# Patient Record
Sex: FEMALE | Race: WHITE | NOT HISPANIC OR LATINO | Employment: FULL TIME | URBAN - METROPOLITAN AREA
[De-identification: names, ages, dates, MRNs, and addresses within clinical notes are randomized per-mention and may not be internally consistent; named-entity substitution may affect disease eponyms.]

---

## 2017-03-15 PROBLEM — C56.9 OVARIAN CANCER: Status: ACTIVE | Noted: 2017-03-15

## 2017-03-15 PROBLEM — T78.40XA ALLERGY: Status: ACTIVE | Noted: 2017-03-15

## 2017-03-29 ENCOUNTER — OFFICE VISIT (OUTPATIENT)
Dept: GYNECOLOGIC ONCOLOGY | Facility: CLINIC | Age: 52
End: 2017-03-29

## 2017-03-29 VITALS
OXYGEN SATURATION: 96 % | SYSTOLIC BLOOD PRESSURE: 119 MMHG | HEART RATE: 75 BPM | RESPIRATION RATE: 16 BRPM | DIASTOLIC BLOOD PRESSURE: 78 MMHG | HEIGHT: 63 IN | WEIGHT: 168 LBS | TEMPERATURE: 97.5 F | BODY MASS INDEX: 29.77 KG/M2

## 2017-03-29 DIAGNOSIS — Z85.43 HISTORY OF OVARIAN CANCER: ICD-10-CM

## 2017-03-29 DIAGNOSIS — Z01.419 WELL WOMAN EXAM WITH ROUTINE GYNECOLOGICAL EXAM: Primary | ICD-10-CM

## 2017-03-29 PROCEDURE — 99396 PREV VISIT EST AGE 40-64: CPT | Performed by: NURSE PRACTITIONER

## 2017-03-29 RX ORDER — LEVETIRACETAM 500 MG/1
750 TABLET ORAL 2 TIMES DAILY
COMMUNITY
Start: 2017-03-27 | End: 2018-03-06 | Stop reason: SDUPTHER

## 2017-03-29 RX ORDER — FLUTICASONE PROPIONATE 50 MCG
2 SPRAY, SUSPENSION (ML) NASAL DAILY
COMMUNITY

## 2017-03-29 RX ORDER — FEXOFENADINE HCL 180 MG/1
180 TABLET ORAL DAILY
COMMUNITY

## 2017-03-29 NOTE — PROGRESS NOTES
GYN ONCOLOGY ANNUAL WELL WOMAN VISIT      Sada Juárez  [id number]  1965      Chief Complaint: Gynecologic Exam (with no complaints)        History of present illness:  Sada Juárez is a 51 y.o. year old female who is here today for an annual exam.  The patient has a remote history of ovarian cancer, diagnosed at the age of 28, status post surgery and completion of chemotherapy with Dr. Corbett at .  She's had no evidence of recurrence to date.  She is feeling overall well and has been able to maintain her weight loss, intentional of 42 pounds over the past year.  She continues to try and be active as well as maintain dietary modifications that helped her succeed in losing weight.  She has been healthy since her last visit with no significant changes in health history.  Her bowels and bladder are functioning well.  She's had no abdominal bloating, early satiety, pelvic pain, vaginal bleeding, or vaginal discharge.  Her mammogram is up-to-date, done this month to [facility].  She had a colonoscopy done for screening through Dr. Mejia at [facility].  Recommendations were made for 5 year follow-up exam.  She has not yet had a bone density for baseline.  She does Renetta exercise with walking several times throughout the week and does take a multivitamin with calcium and vitamin D supplement.  She fractured her left thumb earlier this year and since that time has started glucosamine/chondroitin supplement that does seem to help with her arthritis-type pain.  She does not want to have CA-125 drawn for screening.  She notes that last time she received a pill from her insurance company as her ovarian cancer history is so remote that they did not want to cover her screening tumor markers.    Cancer History:    No history exists.       Obstetric History:  OB History      Para Term  AB TAB SAB Ectopic Multiple Living    3 3                Obstetric Comments    2 children given up for  adoption         Menstrual History:     No LMP recorded. Patient has had a hysterectomy.          Past Medical History:   Diagnosis Date   • History of ovarian cancer    • Seasonal allergies    • Seizure disorder        Past Surgical History:   Procedure Laterality Date   • APPENDECTOMY     • TONSILLECTOMY     • TOTAL ABDOMINAL HYSTERECTOMY WITH SALPINGO OOPHORECTOMY         MEDICATIONS: The current medication list was reviewed and reconciled.     Allergies:  is allergic to sulfa antibiotics.    History reviewed. No pertinent family history.    Health Maintenance:  Last mammogram was 3/2017. Last colonoscopy was 12/2016, with recommended follow-up in 5 year(s). Last pap smear was 2016, results were  normal PAP..    Tumor marker:   Lab Results   Component Value Date     7.4 03/28/2016       Review of Systems   Constitutional: Negative for fatigue, fever and unexpected weight change.   HENT: Negative for congestion, ear pain, hearing loss, sinus pressure and trouble swallowing.    Eyes: Negative for visual disturbance.   Respiratory: Negative for cough, chest tightness, shortness of breath and wheezing.    Cardiovascular: Negative for chest pain, palpitations and leg swelling.   Gastrointestinal: Negative for abdominal distention, abdominal pain, constipation, diarrhea, nausea and vomiting.   Endocrine: Negative for cold intolerance, heat intolerance, polydipsia, polyphagia and polyuria.   Genitourinary: Negative for difficulty urinating, dyspareunia, dysuria, frequency, hematuria, pelvic pain, urgency, vaginal bleeding, vaginal discharge and vaginal pain.   Musculoskeletal: Positive for arthralgias. Negative for gait problem, joint swelling and myalgias.   Skin: Negative for color change, pallor and rash.   Neurological: Negative for dizziness, seizures, syncope, weakness, light-headedness, numbness and headaches.   Hematological: Negative for adenopathy. Does not bruise/bleed easily.   Psychiatric/Behavioral:  "Negative for agitation, confusion, sleep disturbance and suicidal ideas. The patient is not nervous/anxious.        Physical Exam  Vital Signs: /78  Pulse 75  Temp 97.5 °F (36.4 °C) (Temporal Artery )   Resp 16  Ht 63\" (160 cm)  Wt 168 lb (76.2 kg)  SpO2 96%  BMI 29.76 kg/m2   General Appearance:  alert, cooperative, no apparent distress and appears stated age   Neurologic/Psychiatric: A&O x 3, gait steady, appropriate affect   HEENT:  Normocephalic, without obvious abnormality, mucous membranes moist   Neck: Supple, symmetrical, trachea midline, no adenopathy;  No thyromegaly, masses, or tenderness   Back:   Symmetric, no curvature, ROM normal, no CVA tenderness   Lungs:   Clear to auscultation bilaterally; respirations regular, even, and unlabored bilaterally   Heart:  Regular rate and rhythm, no murmurs appreciated   Breasts:  Symmetrical, no masses, no lesions and no nipple discharge   Abdomen:   Soft, non-tender, non-distended and no organomegaly   Lymph nodes: No cervical, supraclavicular, inguinal or axillary adenopathy noted   Extremities: Normal, atraumatic; no clubbing, cyanosis, or edema    Skin: No rashes, ulcers, or suspicious lesions noted   Pelvic: External Genitalia  without lesions or skin changes  Vagina  is pale, atrophic.   Vaginal Cuff  Female Vaginal Cuff: smooth, intact, without visible lesions and pap obtained  Uterus  surgically absent  Ovaries  surgically absent bilaterallly and without palpable masses or fullness  Parametria  smooth  Rectovaginal  Female rectovaginal: confirms no masses or bleeding and Hemoccult negative     ECOG Performance Status: 1 - Symptomatic but completely ambulatory     Procedure Note:  No notes on file    Assessment and Plan:    Sada was seen today for gynecologic exam.    Diagnoses and all orders for this visit:    Well woman exam with routine gynecological exam  -     Liquid-based Pap Smear, Screening; Future    History of ovarian cancer      The " "patient was instructed to call the office in 1 week for results of her pap smear.   The patient was instructed to call with vaginal bleeding, discharge, pelvic pain, change in bowel or bladder function, or any new symptoms for evaluation of her complaints.   Mamm UTD  Colonoscopy UTD- next due 12/2021  Will consider doing baseline bone density at age 54 due to history of early menopause with no current HRT. The patient does take calcium and vitamin D daily, as well as glucosamine supplement, and tries to stay active.   The patient and I discussed the  program for Advanced Care Planning, \"Conversations that Matter\".  She was offered this service, free of charge, for development of advance directives with a certified ACP facilitator. She is understanding that this will allow her to assign a healthcare surrogate and make her wishes known regarding her own future medical care. She is very interested in learning more about this option and was given information at her visit today.    Return in about 1 year (around 3/29/2018) for Annual Exam.      Elizabeth Myles, GINNY      Note: Speech recognition transcription software was used to dictate portions of this document.  An attempt at proofreading has been made though minor errors in transcription may still be present.  Please do not hesitate to call our office with any questions.  "

## 2017-11-16 ENCOUNTER — APPOINTMENT (OUTPATIENT)
Dept: CT IMAGING | Facility: HOSPITAL | Age: 52
End: 2017-11-16

## 2017-11-16 ENCOUNTER — HOSPITAL ENCOUNTER (EMERGENCY)
Facility: HOSPITAL | Age: 52
Discharge: HOME | End: 2017-11-16
Attending: EMERGENCY MEDICINE | Admitting: EMERGENCY MEDICINE

## 2017-11-16 VITALS
HEART RATE: 69 BPM | TEMPERATURE: 98.7 F | OXYGEN SATURATION: 98 % | BODY MASS INDEX: 30.48 KG/M2 | RESPIRATION RATE: 18 BRPM | WEIGHT: 172 LBS | DIASTOLIC BLOOD PRESSURE: 66 MMHG | SYSTOLIC BLOOD PRESSURE: 102 MMHG | HEIGHT: 63 IN

## 2017-11-16 DIAGNOSIS — S00.83XA FACIAL CONTUSION, INITIAL ENCOUNTER: ICD-10-CM

## 2017-11-16 DIAGNOSIS — S01.81XA FACIAL LACERATION, INITIAL ENCOUNTER: ICD-10-CM

## 2017-11-16 DIAGNOSIS — G40.909 SEIZURE DISORDER (HCC): Primary | ICD-10-CM

## 2017-11-16 LAB
ALBUMIN SERPL-MCNC: 4.4 G/DL (ref 3.2–4.8)
ALBUMIN/GLOB SERPL: 1.8 G/DL (ref 1.5–2.5)
ALP SERPL-CCNC: 76 U/L (ref 25–100)
ALT SERPL W P-5'-P-CCNC: 37 U/L (ref 7–40)
ANION GAP SERPL CALCULATED.3IONS-SCNC: 3 MMOL/L (ref 3–11)
AST SERPL-CCNC: 27 U/L (ref 0–33)
BASOPHILS # BLD AUTO: 0.05 10*3/MM3 (ref 0–0.2)
BASOPHILS NFR BLD AUTO: 0.4 % (ref 0–1)
BILIRUB SERPL-MCNC: 0.4 MG/DL (ref 0.3–1.2)
BUN BLD-MCNC: 10 MG/DL (ref 9–23)
BUN/CREAT SERPL: 12.5 (ref 7–25)
CALCIUM SPEC-SCNC: 9.4 MG/DL (ref 8.7–10.4)
CHLORIDE SERPL-SCNC: 106 MMOL/L (ref 99–109)
CO2 SERPL-SCNC: 31 MMOL/L (ref 20–31)
CREAT BLD-MCNC: 0.8 MG/DL (ref 0.6–1.3)
DEPRECATED RDW RBC AUTO: 42.6 FL (ref 37–54)
EOSINOPHIL # BLD AUTO: 0 10*3/MM3 (ref 0–0.3)
EOSINOPHIL NFR BLD AUTO: 0 % (ref 0–3)
ERYTHROCYTE [DISTWIDTH] IN BLOOD BY AUTOMATED COUNT: 12.7 % (ref 11.3–14.5)
GFR SERPL CREATININE-BSD FRML MDRD: 75 ML/MIN/1.73
GLOBULIN UR ELPH-MCNC: 2.4 GM/DL
GLUCOSE BLD-MCNC: 103 MG/DL (ref 70–100)
HCT VFR BLD AUTO: 42.4 % (ref 34.5–44)
HGB BLD-MCNC: 14 G/DL (ref 11.5–15.5)
IMM GRANULOCYTES # BLD: 0.02 10*3/MM3 (ref 0–0.03)
IMM GRANULOCYTES NFR BLD: 0.2 % (ref 0–0.6)
LYMPHOCYTES # BLD AUTO: 1.69 10*3/MM3 (ref 0.6–4.8)
LYMPHOCYTES NFR BLD AUTO: 14.6 % (ref 24–44)
MCH RBC QN AUTO: 30.2 PG (ref 27–31)
MCHC RBC AUTO-ENTMCNC: 33 G/DL (ref 32–36)
MCV RBC AUTO: 91.4 FL (ref 80–99)
MONOCYTES # BLD AUTO: 0.84 10*3/MM3 (ref 0–1)
MONOCYTES NFR BLD AUTO: 7.3 % (ref 0–12)
NEUTROPHILS # BLD AUTO: 8.96 10*3/MM3 (ref 1.5–8.3)
NEUTROPHILS NFR BLD AUTO: 77.5 % (ref 41–71)
PLATELET # BLD AUTO: 245 10*3/MM3 (ref 150–450)
PMV BLD AUTO: 9.2 FL (ref 6–12)
POTASSIUM BLD-SCNC: 4 MMOL/L (ref 3.5–5.5)
PROT SERPL-MCNC: 6.8 G/DL (ref 5.7–8.2)
RBC # BLD AUTO: 4.64 10*6/MM3 (ref 3.89–5.14)
SODIUM BLD-SCNC: 140 MMOL/L (ref 132–146)
WBC NRBC COR # BLD: 11.56 10*3/MM3 (ref 3.5–10.8)

## 2017-11-16 PROCEDURE — 96360 HYDRATION IV INFUSION INIT: CPT

## 2017-11-16 PROCEDURE — 99284 EMERGENCY DEPT VISIT MOD MDM: CPT

## 2017-11-16 PROCEDURE — 85025 COMPLETE CBC W/AUTO DIFF WBC: CPT | Performed by: NURSE PRACTITIONER

## 2017-11-16 PROCEDURE — 72125 CT NECK SPINE W/O DYE: CPT

## 2017-11-16 PROCEDURE — 80053 COMPREHEN METABOLIC PANEL: CPT | Performed by: NURSE PRACTITIONER

## 2017-11-16 PROCEDURE — 70486 CT MAXILLOFACIAL W/O DYE: CPT

## 2017-11-16 PROCEDURE — 70450 CT HEAD/BRAIN W/O DYE: CPT

## 2017-11-16 RX ORDER — SODIUM CHLORIDE 0.9 % (FLUSH) 0.9 %
10 SYRINGE (ML) INJECTION AS NEEDED
Status: DISCONTINUED | OUTPATIENT
Start: 2017-11-16 | End: 2017-11-16 | Stop reason: HOSPADM

## 2017-11-16 RX ORDER — HYDROCODONE BITARTRATE AND ACETAMINOPHEN 7.5; 325 MG/1; MG/1
1 TABLET ORAL ONCE
Status: COMPLETED | OUTPATIENT
Start: 2017-11-16 | End: 2017-11-16

## 2017-11-16 RX ADMIN — SODIUM CHLORIDE 1000 ML: 9 INJECTION, SOLUTION INTRAVENOUS at 11:38

## 2017-11-16 RX ADMIN — HYDROCODONE BITARTRATE AND ACETAMINOPHEN 1 TABLET: 7.5; 325 TABLET ORAL at 11:16

## 2017-11-16 NOTE — ED PROVIDER NOTES
Subjective   HPI Comments: Patient presents to the emergency department with complaint of facial pain after having a seizure and collapsing approximate 4 AM this morning.  Patient was discovered by her  in a postictal state at that time.  Patient sustained superficial lacerations to her nose and over her left eyebrow as well as contusion to her gumline with bleeding that has subsided.  Patient states she has a history of seizure disorder.  She attributes her increase in seizure frequency to potential caffeine withdrawal and lacking some sleep.  Patient has been seen by her neurologist since her most recent seizure at the end of October and her Keppra dose has been increased.     denies any injury other than facial although she has a significant diffuse headache and some neck soreness.  She has no neurosensory complaints or focal weakness.  No vomiting.  No fever.      Patient is a 52 y.o. female presenting with seizures.   History provided by:  Patient  Seizures   Seizure activity on arrival: no    Seizure type:  Unable to specify  Initial focality:  None  Episode characteristics: disorientation    Postictal symptoms: memory loss    Postictal symptoms: no confusion and no somnolence    Return to baseline: yes    Severity:  Mild  Number of seizures this episode:  One  Progression:  Worsening (increased in frequency  this year)  Context: change in medication, decreased sleep and stress    Context: not alcohol withdrawal, not cerebral palsy, not hydrocephalus, not intracranial lesion, not intracranial shunt and not possible medication ingestion    Recent head injury:  No recent head injuries  PTA treatment:  None  History of seizures: yes    Date of initial seizure episode:  About 9 years ago;  took keppra 500mg daily for about 4 years, then increased dosing 5th year and then again this year.   Date of most recent prior episode:  Oct 27  Seizure control level:  Poorly controlled  Home seizure meds:  Keppra.  Compliance with current therapy:  Good      Review of Systems   Constitutional: Negative.  Negative for diaphoresis and fever.   HENT: Negative for sore throat.         Mouth injury; 1 facial lacerations, both superficial and less than one cm         Eyes: Negative.  Negative for pain and visual disturbance.   Respiratory: Negative for cough, shortness of breath, wheezing and stridor.    Cardiovascular: Negative.  Negative for chest pain.   Gastrointestinal: Negative.  Negative for abdominal pain, diarrhea, nausea and vomiting.   Endocrine: Negative.    Genitourinary: Negative.  Negative for dysuria.   Musculoskeletal: Positive for neck pain. Negative for back pain.   Skin: Negative.  Negative for pallor and rash.        2 small, superficial facial lacs     Allergic/Immunologic: Negative.    Neurological: Positive for seizures. Negative for dizziness, syncope and headaches.   Hematological: Negative.  Does not bruise/bleed easily.   Psychiatric/Behavioral: Negative.  Negative for agitation.   All other systems reviewed and are negative.      Past Medical History:   Diagnosis Date   • History of ovarian cancer    • Seasonal allergies    • Seizure disorder    • Seizures        Allergies   Allergen Reactions   • Sulfa Antibiotics        Past Surgical History:   Procedure Laterality Date   • APPENDECTOMY     • TONSILLECTOMY     • TOTAL ABDOMINAL HYSTERECTOMY WITH SALPINGO OOPHORECTOMY         History reviewed. No pertinent family history.    Social History     Social History   • Marital status:      Spouse name: N/A   • Number of children: 1   • Years of education: N/A     Social History Main Topics   • Smoking status: Never Smoker   • Smokeless tobacco: None   • Alcohol use Yes      Comment: RARE   • Drug use: No   • Sexual activity: Defer     Other Topics Concern   • None     Social History Narrative   • None           Objective   Physical Exam   Constitutional: She is oriented to person, place, and  time. She appears well-developed and well-nourished. No distress.   HENT:   Head: Normocephalic. Head is with laceration.       Nose: Nose normal.   Mouth/Throat: Oropharynx is clear and moist. No oropharyngeal exudate.   Hematoma at the left eyebrow with superficial lac 1cm with edges well approximated.  Also a small laceration just left of the nose less than 1cm also well approximated.   Oral exam:  Contusion to the gumline with bruising and resolved bleeding.  Teeth are not disrupted.  Frenulum not disrupted. Tongue is normal    Eyes: EOM are normal. Pupils are equal, round, and reactive to light.   Neck: Normal range of motion. Neck supple.   paraVertebral muscle tenderness to palpation and range of motion     Cardiovascular: Normal rate and regular rhythm.    No murmur heard.  Pulmonary/Chest: Effort normal and breath sounds normal. No stridor. No respiratory distress. She has no wheezes. She has no rales.   Abdominal: Soft. Bowel sounds are normal. She exhibits no distension. There is no tenderness. There is no rebound and no guarding.   Musculoskeletal: Normal range of motion. She exhibits tenderness (Paravertebral cervical tenderness). She exhibits no edema or deformity.   Lymphadenopathy:     She has no cervical adenopathy.   Neurological: She is alert and oriented to person, place, and time. She displays normal reflexes. No cranial nerve deficit. She exhibits normal muscle tone. Coordination normal.   Skin: Skin is warm and dry. No rash noted. She is not diaphoretic. No erythema. No pallor.   Psychiatric: She has a normal mood and affect. Her behavior is normal.   Nursing note and vitals reviewed.      Laceration Repair  Date/Time: 11/16/2017 5:04 PM  Performed by: MAXINE ROBLES  Authorized by: GALILEO PEREZ     Consent:     Consent obtained:  Verbal    Consent given by:  Patient  Anesthesia (see MAR for exact dosages):     Anesthesia method:  None  Laceration details:     Location:  Face     Facial location: one over left eyebrow; one near the nose.    Length (cm):  1 (1cm each x 2 lacerations)    Laceration depth: superificial   Repair type:     Repair type:  Simple  Pre-procedure details:     Preparation:  Patient was prepped and draped in usual sterile fashion  Exploration:     Hemostasis achieved with:  Direct pressure  Treatment:     Area cleansed with:  Hibiclens    Amount of cleaning:  Standard    Irrigation solution:  Sterile saline  Skin repair:     Repair method:  Tissue adhesive  Approximation:     Approximation:  Close    Vermilion border: well-aligned    Post-procedure details:     Dressing:  Open (no dressing)    Patient tolerance of procedure:  Tolerated well, no immediate complications             ED Course  ED Course      Recent Results (from the past 24 hour(s))   Comprehensive Metabolic Panel    Collection Time: 11/16/17 11:27 AM   Result Value Ref Range    Glucose 103 (H) 70 - 100 mg/dL    BUN 10 9 - 23 mg/dL    Creatinine 0.80 0.60 - 1.30 mg/dL    Sodium 140 132 - 146 mmol/L    Potassium 4.0 3.5 - 5.5 mmol/L    Chloride 106 99 - 109 mmol/L    CO2 31.0 20.0 - 31.0 mmol/L    Calcium 9.4 8.7 - 10.4 mg/dL    Total Protein 6.8 5.7 - 8.2 g/dL    Albumin 4.40 3.20 - 4.80 g/dL    ALT (SGPT) 37 7 - 40 U/L    AST (SGOT) 27 0 - 33 U/L    Alkaline Phosphatase 76 25 - 100 U/L    Total Bilirubin 0.4 0.3 - 1.2 mg/dL    eGFR Non African Amer 75 >60 mL/min/1.73    Globulin 2.4 gm/dL    A/G Ratio 1.8 1.5 - 2.5 g/dL    BUN/Creatinine Ratio 12.5 7.0 - 25.0    Anion Gap 3.0 3.0 - 11.0 mmol/L   CBC Auto Differential    Collection Time: 11/16/17 11:27 AM   Result Value Ref Range    WBC 11.56 (H) 3.50 - 10.80 10*3/mm3    RBC 4.64 3.89 - 5.14 10*6/mm3    Hemoglobin 14.0 11.5 - 15.5 g/dL    Hematocrit 42.4 34.5 - 44.0 %    MCV 91.4 80.0 - 99.0 fL    MCH 30.2 27.0 - 31.0 pg    MCHC 33.0 32.0 - 36.0 g/dL    RDW 12.7 11.3 - 14.5 %    RDW-SD 42.6 37.0 - 54.0 fl    MPV 9.2 6.0 - 12.0 fL    Platelets 245 150 - 450  10*3/mm3    Neutrophil % 77.5 (H) 41.0 - 71.0 %    Lymphocyte % 14.6 (L) 24.0 - 44.0 %    Monocyte % 7.3 0.0 - 12.0 %    Eosinophil % 0.0 0.0 - 3.0 %    Basophil % 0.4 0.0 - 1.0 %    Immature Grans % 0.2 0.0 - 0.6 %    Neutrophils, Absolute 8.96 (H) 1.50 - 8.30 10*3/mm3    Lymphocytes, Absolute 1.69 0.60 - 4.80 10*3/mm3    Monocytes, Absolute 0.84 0.00 - 1.00 10*3/mm3    Eosinophils, Absolute 0.00 0.00 - 0.30 10*3/mm3    Basophils, Absolute 0.05 0.00 - 0.20 10*3/mm3    Immature Grans, Absolute 0.02 0.00 - 0.03 10*3/mm3     Note: In addition to lab results from this visit, the labs listed above may include labs taken at another facility or during a different encounter within the last 24 hours. Please correlate lab times with ED admission and discharge times for further clarification of the services performed during this visit.    CT Head Without Contrast   Final Result   1. No acute intracranial abnormality.   2. No depressed calvarial fracture or facial bone fracture.   3. Small soft tissue contusion overlying the left frontal bone.       D:  11/16/2017   E:  11/16/2017               This report was finalized on 11/16/2017 2:17 PM by Dr. Gumaro Pearson.          CT Maxillofacial Without Contrast   Final Result   1. No acute intracranial abnormality.   2. No depressed calvarial fracture or facial bone fracture.   3. Small soft tissue contusion overlying the left frontal bone.       D:  11/16/2017   E:  11/16/2017               This report was finalized on 11/16/2017 2:17 PM by Dr. Gumaro Pearson.          CT Cervical Spine Without Contrast   Final Result   No evidence for acute fracture, subluxation or dislocation of the   cervical spine with background degenerative changes greatest at the   C2-C3 and C5-C6 level on the left where there is at least   moderate-to-severe neuroforaminal narrowing from disc osteophyte complex   and facet hypertrophy components.       D:  11/16/2017   E:  11/16/2017       This report was  finalized on 11/16/2017 2:17 PM by Dr. Gumaro Pearson.            Vitals:    11/16/17 1217 11/16/17 1218 11/16/17 1300 11/16/17 1330   BP: 105/60  105/61 102/66   Pulse:  69     Resp:       Temp:       TempSrc:       SpO2:  98% 100% 98%   Weight:       Height:         Medications   sodium chloride 0.9 % flush 10 mL (not administered)   sodium chloride 0.9 % bolus 1,000 mL (0 mL Intravenous Stopped 11/16/17 1240)   HYDROcodone-acetaminophen (NORCO) 7.5-325 MG per tablet 1 tablet (1 tablet Oral Given 11/16/17 1116)     ECG/EMG Results (last 24 hours)     ** No results found for the last 24 hours. **                      [facility]    Final diagnoses:   Seizure disorder   Facial contusion, initial encounter   Facial laceration, initial encounter            Estela Song, APRN  11/16/17 [id number]

## 2017-12-05 ENCOUNTER — OFFICE VISIT (OUTPATIENT)
Dept: NEUROLOGY | Facility: CLINIC | Age: 52
End: 2017-12-05

## 2017-12-05 VITALS
HEIGHT: 63 IN | DIASTOLIC BLOOD PRESSURE: 76 MMHG | BODY MASS INDEX: 30.48 KG/M2 | WEIGHT: 172 LBS | SYSTOLIC BLOOD PRESSURE: 120 MMHG

## 2017-12-05 DIAGNOSIS — G40.919 EPILEPSY UNDETERMINED AS TO FOCAL OR GENERALIZED, INTRACTABLE (HCC): Primary | ICD-10-CM

## 2017-12-05 PROCEDURE — 99205 OFFICE O/P NEW HI 60 MIN: CPT | Performed by: PSYCHIATRY & NEUROLOGY
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
## 2017-12-11 ENCOUNTER — TELEPHONE (OUTPATIENT)
Dept: NEUROLOGY | Facility: CLINIC | Age: 52
End: 2017-12-11

## 2017-12-11 RX ORDER — LACOSAMIDE 50 MG/1
TABLET ORAL
Qty: 42 TABLET | Refills: 0 | Status: SHIPPED | OUTPATIENT
Start: 2017-12-11 | End: 2018-03-06

## 2017-12-11 NOTE — TELEPHONE ENCOUNTER
----- Message from Nicolette Palumbo MD sent at 12/11/2017  1:11 PM EST -----  Just sent script for 2 week trial of vimpat. I will write out instructions, to be sent with coupon to pt. She should follow instructions that I write out.

## 2017-12-11 NOTE — TELEPHONE ENCOUNTER
Called pt and had to lvm.  Wanted to let her know that Dr. Palumbo would like to try Lacosamide (Vimpat) and has sent in a script to pharmacy on file.  She also wants her to know that the office has mailed her a coupon for the first 2 weeks free and sent hand written instruction on how to take the medication and to ignore the instructions on the bottle because this is the only way to get the amount of medication through the pharmacy/insurance.  If the medication is tolerated she should call the office and we can mail her a co-pay card to help her with the expense.  Before I could finish my note pt called back and I explained it all to her and she verbalized understanding.

## 2017-12-14 ENCOUNTER — TELEPHONE (OUTPATIENT)
Dept: NEUROLOGY | Facility: CLINIC | Age: 52
End: 2017-12-14

## 2017-12-14 NOTE — TELEPHONE ENCOUNTER
She should continue the keppra until we get the vimpat dose up to 150mg bid (assuming the titration goes OK), and she can then begin slow taper. She should call when Vimpat up to 150mg bid, which will be after the initial titration with free script, and after that, a new script to continue titration.

## 2017-12-14 NOTE — TELEPHONE ENCOUNTER
----- Message from Echo Montgomery sent at 12/14/2017  8:55 AM EST -----  Contact: Sada Dominiquethia got a new prescription for Vimpat from [facility] and she wants to know if she is supposed to still take the Keppra too?    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

## 2018-01-17 ENCOUNTER — TELEPHONE (OUTPATIENT)
Dept: NEUROLOGY | Facility: CLINIC | Age: 53
End: 2018-01-17

## 2018-01-17 NOTE — TELEPHONE ENCOUNTER
----- Message from Ken Salcedo MD sent at 1/16/2018 12:22 PM EST -----  Contact: Sada  Decrease Keppra to 500 mg bid for 1 month, then to 250 mg bid for 1 month, then stop. Thanks.    ----- Message -----     From: Patricia Danielle Snellen, CMA     Sent: 1/16/2018  11:00 AM       To: Ken Salcedo MD        ----- Message -----     From: Echo Montgomery     Sent: 1/15/2018   1:02 PM       To: [facility]    Sada called and is needing instructions on how to wean off of Keppra    Please call Sada back  [phone]

## 2018-01-17 NOTE — TELEPHONE ENCOUNTER
Returned pts call informed her of  instructions, she verbalized understanding and will start titration on Sunday, she will call back if needed.

## 2018-01-18 ENCOUNTER — TELEPHONE (OUTPATIENT)
Dept: NEUROLOGY | Facility: CLINIC | Age: 53
End: 2018-01-18

## 2018-01-18 NOTE — TELEPHONE ENCOUNTER
Returned pts call and informed her of Dr. Palumbo's  Instructions, she verbalized understanding and will keep in touch regarding sz symptoms, she will cb if needed.

## 2018-01-18 NOTE — TELEPHONE ENCOUNTER
----- Message from Nicolette Palumbo MD sent at 1/18/2018  2:37 PM EST -----  Contact: Sada  This plan is fine. Vimpat is the lowest dose, so if any suspicion of seizures we will need to increase it. Pls just encourage her to keep in touch regarding symptoms, and can discuss at her f/u in 6 weeks.   ----- Message -----     From: Patricia Danielle Snellen, CMA     Sent: 1/18/2018   2:04 PM       To: Nicolette Palumbo MD     Contacted Patient informed her of your instructions She is taking Vimpat 150 mg BID currently. She said she thought she was only increasing to150 mg BID, that 200 mg  BID vimpat had not been discussed she has not received Script. She is currently taking Keppra 1500 mg BID, on Sunday she will decrease to 1000 mg BID for 1 month, then 500 mg BID for 1 month, then stop. Thanks   ----- Message -----     From: Nicolette Palumbo MD     Sent: 1/18/2018   1:23 PM       To: Patricia Danielle Snellen, CMA    I believe Dr Salcedo answered this already for me, but pls check: is she on vimpat 150mg bid right now? Did I send script in yet for 200mg bid?   Also, agree with monthly decrease in Keppra, but pls double check with her whether starting at 1000mg bid or 1500mg bid. I want her to go down by just 500 (2x/day)/month. That is, if on 1500mg bid go to 1000mg bid, then 500bid, etc. If starting at 1000mg bid, go to 500mg bid, then 250bid, etc.  ----- Message -----     From: Patricia Danielle Snellen, CMA     Sent: 1/15/2018   1:05 PM       To: Nicolette Palumbo MD        ----- Message -----     From: Echo Montgomery     Sent: 1/15/2018   1:02 PM       To: [facility]    Deepali Tomlin called and is needing instructions on how to wean off of Keppra    Please call Sada back  [phone]

## 2018-02-26 ENCOUNTER — TELEPHONE (OUTPATIENT)
Dept: NEUROLOGY | Facility: CLINIC | Age: 53
End: 2018-02-26

## 2018-02-26 NOTE — TELEPHONE ENCOUNTER
FERNANDOM for patient with  instructions and to call back if needed. Requested EEG from [facility].

## 2018-02-26 NOTE — TELEPHONE ENCOUNTER
----- Message from Nicolette Palumbo MD sent at 2/26/2018  1:41 PM EST -----  Regarding: RE: SZ  Contact: [phone]  I'd like her to increase keppra to 750mg bid, no change vimpat. Also, did we ever get old EEG from [facility]? I see note from Dr Lisa, but [facility] should have an eeg as well  ----- Message -----     From: Patricia Danielle Snellen, CMA     Sent: 2/26/2018  10:37 AM       To: Nicolette Palumbo MD  Subject: SZ                                               Patient called and states she had sz Saturday morning. She is taking     500 mg bid of Keppra   150 bid vimpat

## 2018-02-27 ENCOUNTER — TELEPHONE (OUTPATIENT)
Dept: NEUROLOGY | Facility: CLINIC | Age: 53
End: 2018-02-27

## 2018-02-27 NOTE — TELEPHONE ENCOUNTER
Jahaira Tomlin's call informed her of  instructions,she verbalized understanding and will call back if needed.

## 2018-02-27 NOTE — TELEPHONE ENCOUNTER
----- Message from Echo Montgomery sent at 2/27/2018  1:00 PM EST -----  Contact: Sada Tomlin called and said she never received a phone call back yesterday. I let her know that it looks like someone from our office did call and left a voice mail for her. She said she did receive the voice mail and requested they call her back.    Please call Sada back  [phone]

## 2018-03-06 ENCOUNTER — OFFICE VISIT (OUTPATIENT)
Dept: NEUROLOGY | Facility: CLINIC | Age: 53
End: 2018-03-06

## 2018-03-06 VITALS
BODY MASS INDEX: 30.48 KG/M2 | HEIGHT: 63 IN | DIASTOLIC BLOOD PRESSURE: 82 MMHG | SYSTOLIC BLOOD PRESSURE: 116 MMHG | WEIGHT: 172 LBS

## 2018-03-06 DIAGNOSIS — G40.419 EPILEPSY, GENERALIZED TONIC-CLONIC, INTRACTABLE (HCC): ICD-10-CM

## 2018-03-06 DIAGNOSIS — G40.909 SEIZURE DISORDER (HCC): Primary | ICD-10-CM

## 2018-03-06 PROCEDURE — 99214 OFFICE O/P EST MOD 30 MIN: CPT | Performed by: PSYCHIATRY & NEUROLOGY

## 2018-03-06 RX ORDER — LEVETIRACETAM 750 MG/1
750 TABLET ORAL EVERY 12 HOURS SCHEDULED
Qty: 60 TABLET | Refills: 3 | Status: SHIPPED | OUTPATIENT
Start: 2018-03-06 | End: 2018-06-14 | Stop reason: SDUPTHER

## 2018-03-06 RX ORDER — LACOSAMIDE 200 MG/1
200 TABLET ORAL EVERY 12 HOURS SCHEDULED
Qty: 60 TABLET | Refills: 3 | Status: SHIPPED | OUTPATIENT
Start: 2018-03-06 | End: 2018-07-11 | Stop reason: SDUPTHER
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
## 2018-06-14 RX ORDER — LEVETIRACETAM 750 MG/1
TABLET ORAL
Qty: 60 TABLET | Refills: 1 | Status: SHIPPED | OUTPATIENT
Start: 2018-06-14 | End: 2018-09-06 | Stop reason: SDUPTHER

## 2018-07-11 ENCOUNTER — OFFICE VISIT (OUTPATIENT)
Dept: NEUROLOGY | Facility: CLINIC | Age: 53
End: 2018-07-11

## 2018-07-11 VITALS
WEIGHT: 174 LBS | BODY MASS INDEX: 30.83 KG/M2 | DIASTOLIC BLOOD PRESSURE: 64 MMHG | HEIGHT: 63 IN | SYSTOLIC BLOOD PRESSURE: 102 MMHG

## 2018-07-11 DIAGNOSIS — G40.309 EPILEPSY, GENERALIZED, CONVULSIVE (HCC): Primary | ICD-10-CM

## 2018-07-11 PROCEDURE — 99213 OFFICE O/P EST LOW 20 MIN: CPT | Performed by: PSYCHIATRY & NEUROLOGY

## 2018-07-11 RX ORDER — LACOSAMIDE 200 MG/1
TABLET ORAL
Qty: 65 TABLET | Refills: 5 | Status: SHIPPED | OUTPATIENT
Start: 2018-07-11 | End: 2018-07-19 | Stop reason: SDUPTHER
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
## 2018-07-19 RX ORDER — LACOSAMIDE 200 MG/1
TABLET ORAL
Qty: 65 TABLET | Refills: 5 | Status: SHIPPED | OUTPATIENT
Start: 2018-07-19 | End: 2019-01-10 | Stop reason: SDUPTHER

## 2018-07-19 NOTE — TELEPHONE ENCOUNTER
LVM with PT to inform her the RX for VIMPAT has been refilled and is ready to be picked up from [facility] pharmacy.

## 2018-09-06 RX ORDER — LEVETIRACETAM 750 MG/1
TABLET ORAL
Qty: 60 TABLET | Refills: 1 | Status: SHIPPED | OUTPATIENT
Start: 2018-09-06 | End: 2018-11-07 | Stop reason: SDUPTHER

## 2018-11-07 RX ORDER — LEVETIRACETAM 750 MG/1
TABLET ORAL
Qty: 60 TABLET | Refills: 1 | Status: SHIPPED | OUTPATIENT
Start: 2018-11-07 | End: 2019-01-07 | Stop reason: SDUPTHER

## 2018-12-17 ENCOUNTER — TELEPHONE (OUTPATIENT)
Dept: NEUROLOGY | Facility: CLINIC | Age: 53
End: 2018-12-17

## 2018-12-17 NOTE — TELEPHONE ENCOUNTER
Megan Colin missed her morning medications for the 1st time on Tuesday morning and states she had a SZ on Thursday and another on Saturday. She is very concerned so did not drive to work this morning.    Please Advise.

## 2018-12-18 NOTE — TELEPHONE ENCOUNTER
Relayed 's instructions to Megan. Provider addressed all of her needs over the phone and she understands and is appreciative of the help!  Patient will update us of her status this next week in regards to any seizure activity.
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
## 2019-01-07 RX ORDER — LEVETIRACETAM 750 MG/1
750 TABLET ORAL EVERY 12 HOURS
Qty: 60 TABLET | Refills: 1 | Status: SHIPPED | OUTPATIENT
Start: 2019-01-07 | End: 2019-03-06 | Stop reason: SDUPTHER

## 2019-01-07 RX ORDER — LEVETIRACETAM 750 MG/1
TABLET ORAL
Qty: 60 TABLET | Refills: 1 | Status: SHIPPED | OUTPATIENT
Start: 2019-01-07 | End: 2019-01-07 | Stop reason: SDUPTHER

## 2019-01-10 RX ORDER — LACOSAMIDE 200 MG/1
TABLET ORAL
Qty: 65 TABLET | Refills: 2 | Status: SHIPPED | OUTPATIENT
Start: 2019-01-10 | End: 2019-05-01 | Stop reason: SDUPTHER

## 2019-01-11 ENCOUNTER — TELEPHONE (OUTPATIENT)
Dept: NEUROLOGY | Facility: CLINIC | Age: 54
End: 2019-01-11

## 2019-01-11 NOTE — TELEPHONE ENCOUNTER
Patient called and advised that she took her Keppra and Vimpat about 3.5 hours late today and she wanted to see how long she should make sure to take medicine apart as she does not want to take to soon since she took late

## 2019-03-04 ENCOUNTER — TELEPHONE (OUTPATIENT)
Dept: NEUROLOGY | Facility: CLINIC | Age: 54
End: 2019-03-04

## 2019-03-04 NOTE — TELEPHONE ENCOUNTER
----- Message from Warren Gutierrez sent at 3/4/2019  8:02 AM EST -----  Contact: CECLIIO VALADEZ:    PT HAD A SEIZURE THIS MORNING, SHE SAID SHE IS ON AN ANTIBIOTIC, HAS BEEN SINCE LAST Thursday. WONDERING IF THAT WAS THE ISSUE.     [phone]

## 2019-03-04 NOTE — TELEPHONE ENCOUNTER
Yes, it is possible, although also possible that it was the underlying illness/infection that was the cause. If still on the antibiotic, it would be reasonable to take an extra vimpat tab daily if tolerated. (I think she’s on vimpat — pls check before giving this advice). Can also check with pharmacist if there is a known interaction.

## 2019-03-06 ENCOUNTER — OFFICE VISIT (OUTPATIENT)
Dept: NEUROLOGY | Facility: CLINIC | Age: 54
End: 2019-03-06

## 2019-03-06 VITALS
HEART RATE: 101 BPM | HEIGHT: 63 IN | OXYGEN SATURATION: 96 % | BODY MASS INDEX: 30.83 KG/M2 | WEIGHT: 174 LBS | SYSTOLIC BLOOD PRESSURE: 110 MMHG | DIASTOLIC BLOOD PRESSURE: 78 MMHG

## 2019-03-06 DIAGNOSIS — G40.919 EPILEPSY UNDETERMINED AS TO FOCAL OR GENERALIZED, INTRACTABLE (HCC): Primary | ICD-10-CM

## 2019-03-06 PROCEDURE — 99214 OFFICE O/P EST MOD 30 MIN: CPT | Performed by: PSYCHIATRY & NEUROLOGY

## 2019-03-06 RX ORDER — LEVETIRACETAM 750 MG/1
750 TABLET ORAL EVERY 12 HOURS
Qty: 60 TABLET | Refills: 1 | Status: SHIPPED | OUTPATIENT
Start: 2019-03-06 | End: 2019-04-23
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
## 2019-03-14 ENCOUNTER — TELEPHONE (OUTPATIENT)
Dept: NEUROLOGY | Facility: CLINIC | Age: 54
End: 2019-03-14

## 2019-03-14 NOTE — TELEPHONE ENCOUNTER
She doesn't have to take any additional pills but in the future she can take her dose whenever she remembers it

## 2019-03-14 NOTE — TELEPHONE ENCOUNTER
Patient called and advised that she discovered last night that she forgot to take her morning dose of her Brivact and that she took night dose at regular time and she wanted to see what she needs to do does she need to just go back to regular schedule or take miss dose

## 2019-04-23 ENCOUNTER — OFFICE VISIT (OUTPATIENT)
Dept: NEUROLOGY | Facility: CLINIC | Age: 54
End: 2019-04-23

## 2019-04-23 VITALS
WEIGHT: 174 LBS | OXYGEN SATURATION: 98 % | HEIGHT: 63 IN | SYSTOLIC BLOOD PRESSURE: 112 MMHG | DIASTOLIC BLOOD PRESSURE: 72 MMHG | BODY MASS INDEX: 30.83 KG/M2 | HEART RATE: 86 BPM

## 2019-04-23 DIAGNOSIS — G47.33 OBSTRUCTIVE SLEEP APNEA: Primary | ICD-10-CM

## 2019-04-23 DIAGNOSIS — G40.919 EPILEPSY UNDETERMINED AS TO FOCAL OR GENERALIZED, INTRACTABLE (HCC): ICD-10-CM

## 2019-04-23 PROCEDURE — 99215 OFFICE O/P EST HI 40 MIN: CPT | Performed by: PSYCHIATRY & NEUROLOGY
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
## 2019-05-01 RX ORDER — LACOSAMIDE 200 MG/1
TABLET ORAL
Qty: 65 TABLET | Refills: 2 | Status: SHIPPED | OUTPATIENT
Start: 2019-05-01 | End: 2019-06-10 | Stop reason: SDUPTHER

## 2019-06-10 ENCOUNTER — OFFICE VISIT (OUTPATIENT)
Dept: NEUROLOGY | Facility: CLINIC | Age: 54
End: 2019-06-10

## 2019-06-10 VITALS
HEART RATE: 78 BPM | RESPIRATION RATE: 16 BRPM | HEIGHT: 63 IN | SYSTOLIC BLOOD PRESSURE: 108 MMHG | BODY MASS INDEX: 29.23 KG/M2 | OXYGEN SATURATION: 98 % | DIASTOLIC BLOOD PRESSURE: 68 MMHG | WEIGHT: 165 LBS

## 2019-06-10 DIAGNOSIS — G40.919 EPILEPSY UNDETERMINED AS TO FOCAL OR GENERALIZED, INTRACTABLE (HCC): Primary | ICD-10-CM

## 2019-06-10 PROCEDURE — 99213 OFFICE O/P EST LOW 20 MIN: CPT | Performed by: PSYCHIATRY & NEUROLOGY

## 2019-06-10 RX ORDER — LACOSAMIDE 200 MG/1
TABLET ORAL
Qty: 60 TABLET | Refills: 5 | Status: SHIPPED | OUTPATIENT
Start: 2019-06-10 | End: 2019-12-20 | Stop reason: SDUPTHER
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
## 2019-06-21 ENCOUNTER — CONSULT (OUTPATIENT)
Dept: SLEEP MEDICINE | Facility: HOSPITAL | Age: 54
End: 2019-06-21

## 2019-06-21 VITALS
TEMPERATURE: 98.7 F | SYSTOLIC BLOOD PRESSURE: 115 MMHG | RESPIRATION RATE: 16 BRPM | WEIGHT: 165 LBS | OXYGEN SATURATION: 99 % | BODY MASS INDEX: 29.23 KG/M2 | HEART RATE: 79 BPM | DIASTOLIC BLOOD PRESSURE: 54 MMHG | HEIGHT: 63 IN

## 2019-06-21 DIAGNOSIS — R06.83 SNORING: Primary | ICD-10-CM

## 2019-06-21 DIAGNOSIS — G40.919 EPILEPSY UNDETERMINED AS TO FOCAL OR GENERALIZED, INTRACTABLE (HCC): ICD-10-CM

## 2019-06-21 DIAGNOSIS — G47.33 OBSTRUCTIVE SLEEP APNEA: ICD-10-CM

## 2019-06-21 DIAGNOSIS — E66.3 OVERWEIGHT: ICD-10-CM

## 2019-06-21 PROCEDURE — 99204 OFFICE O/P NEW MOD 45 MIN: CPT | Performed by: INTERNAL MEDICINE

## 2019-06-21 NOTE — PATIENT INSTRUCTIONS
Seizure, Adult  A seizure is a sudden burst of abnormal electrical activity in the brain. The abnormal activity temporarily interrupts normal brain function, causing a person to experience any of the following:  · Involuntary movements.  · Changes in awareness or consciousness.  · Uncontrollable shaking (convulsions).    Seizures usually last from 30 seconds to 2 minutes. They usually do not cause permanent brain damage unless they are prolonged.  What can cause a seizure to happen?  Seizures can happen for many reasons including:  · A fever.  · Low blood sugar.  · A medicine.  · An illnesses.  · A brain injury.    Some people who have a seizure never have another one. People who have repeated seizures have a condition called epilepsy.  What are the symptoms of a seizure?  Symptoms of a seizure vary greatly from person to person. They include:  · Convulsions.  · Stiffening of the body.  · Involuntary movements of the arms or legs.  · Loss of consciousness.  · Breathing problems.  · Falling suddenly.  · Confusion.  · Head nodding.  · Eye blinking or fluttering.  · Lip smacking.  · Drooling.  · Rapid eye movements.  · Grunting.  · Loss of bladder control and bowel control.  · Staring.  · Unresponsiveness.    Some people have symptoms right before a seizure happens (aura) and right after a seizure happens. Symptoms of an aura include:  · Fear or anxiety.  · Nausea.  · Feeling like the room is spinning (vertigo).  · A feeling of having seen or heard something before (daniel vu).  · Odd tastes or smells.  · Changes in vision, such as seeing flashing lights or spots.    Symptoms that may follow a seizure include:  · Confusion.  · Sleepiness.  · Headache.  · Weakness of one side of the body.    Follow these instructions at home:  Medicines    · Take over-the-counter and prescription medicines only as told by your health care provider.  · Avoid any substances that may prevent your medicine from working properly, such as  alcohol.  Activity  · Do not drive, swim, or do any other activities that would be dangerous if you had another seizure. Wait until your health care provider approves.  · If you live in the [place] check with your local DMV (department of motor vehicles) to find out about the local driving laws. Each state has specific rules about when you can legally return to driving.  · Get enough rest. Lack of sleep can make seizures more likely to occur.  Educating others  Teach friends and family what to do if you have a seizure. They should:  · Lay you on the ground to prevent a fall.  · Cushion your head and body.  · Loosen any tight clothing around your neck.  · Turn you on your side. If vomiting occurs, this helps keep your airway clear.  · Stay with you until you recover.  · Not hold you down. Holding you down will not stop the seizure.  · Not put anything in your mouth.  · Know whether or not you need emergency care.    General instructions  · Contact your health care provider each time you have a seizure.  · Avoid anything that has ever triggered a seizure for you.  · Keep a seizure diary. Record what you remember about each seizure, especially anything that might have triggered the seizure.  · Keep all follow-up visits as told by your health care provider. This is important.  Contact a health care provider if:  · You have another seizure.  · You have seizures more often.  · Your seizure symptoms change.  · You continue to have seizures with treatment.  · You have symptoms of an infection or illness. They might increase your risk of having a seizure.  Get help right away if:  · You have a seizure:  ? That lasts longer than 5 minutes.  ? That is different than previous seizures.  ? That leaves you unable to speak or use a part of your body.  ? That makes it harder to breathe.  ? After a head injury.  · You have:  ? Multiple seizures in a row.  ? Confusion or a severe headache right after a seizure.  · You are having  seizures more often.  · You do not wake up immediately after a seizure.  · You injure yourself during a seizure.  These symptoms may represent a serious problem that is an emergency. Do not wait to see if the symptoms will go away. Get medical help right away. Call your local emergency services (911 in the [place] Do not drive yourself to the hospital.  This information is not intended to replace advice given to you by your health care provider. Make sure you discuss any questions you have with your health care provider.  Document Released: 12/15/2001 Document Revised: 08/13/2017 Document Reviewed: 07/21/2017  [employer] Interactive Patient Education © 2019 [employer]

## 2019-06-21 NOTE — PROGRESS NOTES
Subjective   Sada Juárez is a 54 y.o. female is being seen for consultation today at the request of Rosita Baker MD for the evaluation of snoring and possible nocturnal seizures.  Her primary care provider is Dr. Juan Ramon Aldana.    History of Present Illness  Patient apparently had her first grand mal seizure at age 19 but then did not have further seizures until about 7 years ago.  She has had occasional seizures since.  Almost all of her seizures have occurred immediately after arising in the morning.  She has seen Dr. Baker neurology and is referred for evaluation of possible nocturnal seizures.  She had her last seizure on March 4.  It occurred 30 minutes after arising.  She she has not been aware of any occurring during the night.  She denies being incontinent at night.  Her  has not seen her have a seizure at night.  She denies falling asleep or sitting quietly during the day.  She denies having problems when she was driving.    She does have a history of loud snoring.  She denies awakening gasping for breath.  She says she is usually rested in the morning.  She denies morning headaches.  She has awaken with a dry mouth but denies awakening coughing.  She broke her nose about 7 years ago occasionally has trouble breathing through her nose.  She also has a lot of allergy problems.  She denies any reflux.  She denies hypnagogic hallucinations sleep paralysis.  She has occasional kicking of her legs at night but is not keep her awake.  She has occasional knee pain.  She says she is actually lost about 10 pounds in the past year.    She goes to bed about 10 PM.  She will fall asleep in 5 minutes.  She awakens twice during the night.  She thinks she gets 5 to 7 hours of sleep arising at 4 AM.  She says she usually feels fine.  She denies any hypertension diabetes coronary artery disease.  She has a history of arthritis as well as a history of a seizure disorder.  Allergies   Allergen Reactions   •  Sulfa Antibiotics    She is allergic to pollens      Current Outpatient Medications:   •  Brivaracetam (BRIVIACT) 50 MG tablet, Take 50 mg by mouth Every 12 (Twelve) Hours., Disp: 60 tablet, Rfl: 5  •  fexofenadine (ALLEGRA) 180 MG tablet, Take 180 mg by mouth Daily., Disp: , Rfl:   •  fluticasone (FLONASE) 50 MCG/ACT nasal spray, 2 sprays into each nostril Daily., Disp: , Rfl:   •  lacosamide (VIMPAT) 200 MG tablet, Take one tab every 12 hours, may take extra tab as directed, Disp: 60 tablet, Rfl: 5    Social History    Tobacco Use      Smoking status: Former Smoker she smoked 1/2 pack/day for 10 years and quit 5 years ago.        Types: Cigarettes      Smokeless tobacco: Never Used       Social History     Substance and Sexual Activity   Alcohol Use Not on file    Comment: RARE       Caffeine: He has 2 cups of coffee per day and may have 2 servings of tea per week    Past Medical History:   Diagnosis Date   • History of ovarian cancer    • Seasonal allergies    • Seizure disorder (CMS/HCC)    • Seizures (CMS/HCC)        Past Surgical History:   Procedure Laterality Date   • APPENDECTOMY     • TONSILLECTOMY     • TOTAL ABDOMINAL HYSTERECTOMY WITH SALPINGO OOPHORECTOMY     She has had left knee surgery once and right knee surgery twice    Family History   Problem Relation Age of Onset   • Hypertension Father    • Cancer Maternal Grandmother    • Alzheimer's disease Maternal Grandfather        The following portions of the patient's history were reviewed and updated as appropriate: allergies, current medications, past family history, past medical history, past social history, past surgical history and problem list.    Review of Systems   Constitutional: Negative.    HENT: Positive for sinus pressure and tinnitus.    Eyes: Negative.    Respiratory: Negative.    Cardiovascular: Negative.    Gastrointestinal: Negative.    Endocrine: Negative.    Genitourinary: Negative.    Musculoskeletal: Positive for arthralgias and  "joint swelling.   Skin: Negative.    Allergic/Immunologic: Positive for environmental allergies.   Neurological: Positive for tremors and seizures.   Hematological: Negative.    Psychiatric/Behavioral: Positive for confusion and decreased concentration.   [place] score is 12/24    Objective     /54   Pulse 79   Temp 98.7 °F (37.1 °C) (Temporal)   Resp 16   Ht 160 cm (63\")   Wt 74.8 kg (165 lb)   SpO2 99%   BMI 29.23 kg/m²      Physical Exam   Constitutional: She is oriented to person, place, and time. She appears well-developed and well-nourished.   She is overweight.   HENT:   Head: Normocephalic and atraumatic.   She has nasal airway narrowing and Mallampati class IV anatomy.   Eyes: EOM are normal. Pupils are equal, round, and reactive to light.   Neck: Normal range of motion. Neck supple.   Cardiovascular: Normal rate, regular rhythm and normal heart sounds.   Pulmonary/Chest: Effort normal and breath sounds normal.   Abdominal: Soft. Bowel sounds are normal.   Musculoskeletal: Normal range of motion. She exhibits no edema.   Neurological: She is alert and oriented to person, place, and time.   Skin: Skin is warm and dry.   Psychiatric: She has a normal mood and affect. Her behavior is normal.         Assessment/Plan   Sada was seen today for seizures.    Diagnoses and all orders for this visit:    Snoring  -     Polysomnography 4 or More Parameters; Future    Obstructive sleep apnea  -     Polysomnography 4 or More Parameters; Future    Epilepsy undetermined as to focal or generalized, intractable (CMS/HCC)  -     Polysomnography 4 or More Parameters; Future    Overweight    Patient has a history of snoring and sometimes nonrestorative sleep.  She has a history of seizure disorder that occurs frequently just after arising.  She is unsure she never had any seizures at night.  We will plan to proceed to polysomnogram with seizure montage.  I have discussed possible treatments for sleep apnea " including CPAP, weight control, oral appliance, and surgery.  We have also discussed the long-term consequences of untreated obstructive sleep apnea.  She is to return in after study.  She is encouraged to achieve ideal body weight.  She is encouraged to avoid alcohol and sedatives close to bedtime.  She is encouraged to practice lateral position sleep.         Jose Bentley MD [facility]  Sleep Medicine  Pulmonary and Critical Care Medicine

## 2019-08-22 ENCOUNTER — HOSPITAL ENCOUNTER (OUTPATIENT)
Dept: SLEEP MEDICINE | Facility: HOSPITAL | Age: 54
Discharge: HOME | End: 2019-08-22
Admitting: INTERNAL MEDICINE

## 2019-08-22 VITALS
OXYGEN SATURATION: 95 % | SYSTOLIC BLOOD PRESSURE: 108 MMHG | HEART RATE: 74 BPM | WEIGHT: 166.13 LBS | BODY MASS INDEX: 29.44 KG/M2 | HEIGHT: 63 IN | DIASTOLIC BLOOD PRESSURE: 75 MMHG

## 2019-08-22 DIAGNOSIS — G47.33 OBSTRUCTIVE SLEEP APNEA: ICD-10-CM

## 2019-08-22 DIAGNOSIS — G40.919 EPILEPSY UNDETERMINED AS TO FOCAL OR GENERALIZED, INTRACTABLE (HCC): ICD-10-CM

## 2019-08-22 DIAGNOSIS — R06.83 SNORING: ICD-10-CM

## 2019-08-22 PROCEDURE — 95810 POLYSOM 6/> YRS 4/> PARAM: CPT | Performed by: INTERNAL MEDICINE

## 2019-08-22 PROCEDURE — 95810 POLYSOM 6/> YRS 4/> PARAM: CPT

## 2019-08-23 DIAGNOSIS — R06.83 SNORING: ICD-10-CM

## 2019-08-23 DIAGNOSIS — G47.33 OSA (OBSTRUCTIVE SLEEP APNEA): Primary | ICD-10-CM

## 2019-08-23 DIAGNOSIS — G47.19 EXCESSIVE DAYTIME SLEEPINESS: ICD-10-CM

## 2019-08-30 ENCOUNTER — OFFICE VISIT (OUTPATIENT)
Dept: SLEEP MEDICINE | Facility: HOSPITAL | Age: 54
End: 2019-08-30

## 2019-08-30 VITALS
BODY MASS INDEX: 29.2 KG/M2 | OXYGEN SATURATION: 96 % | SYSTOLIC BLOOD PRESSURE: 107 MMHG | DIASTOLIC BLOOD PRESSURE: 59 MMHG | WEIGHT: 164.8 LBS | HEIGHT: 63 IN | HEART RATE: 74 BPM

## 2019-08-30 DIAGNOSIS — G40.919 EPILEPSY UNDETERMINED AS TO FOCAL OR GENERALIZED, INTRACTABLE (HCC): ICD-10-CM

## 2019-08-30 DIAGNOSIS — G47.33 OBSTRUCTIVE SLEEP APNEA: Primary | ICD-10-CM

## 2019-08-30 PROCEDURE — 99213 OFFICE O/P EST LOW 20 MIN: CPT | Performed by: NURSE PRACTITIONER

## 2019-08-30 RX ORDER — AMOXICILLIN 500 MG/1
CAPSULE ORAL
COMMUNITY
Start: 2019-08-26 | End: 2019-12-20

## 2019-08-30 NOTE — PATIENT INSTRUCTIONS

## 2019-08-30 NOTE — PROGRESS NOTES
"    Chief Complaint:   Chief Complaint   Patient presents with   • Follow-up       HPI:    Sada Juárez is a 54 y.o. female here for follow-up of sleep study results.  Patient was seen here in consult 6/21/2019.  Patient has a history of grand mal seizures last one being March 4, 2019.  Usually these seizures occur 30 minutes after arising.  Patient also has snoring but does states she feels \"fine and rested upon awakening.\"  Patient is sleeping 5 to 7 hours nightly.  Patient has an [place] score of 12/24.  Patient had a sleep study 8/22/2019 that did show mild obstructive sleep apnea.  However, patient had no nocturnal seizures on the night of the study.  She understands the consequences of untreated sleep apnea as well as therapies available to her.  Patient wishes to initiate CPAP therapy.      Current medications are:   Current Outpatient Medications:   •  amoxicillin (AMOXIL) 500 MG capsule, , Disp: , Rfl:   •  Brivaracetam (BRIVIACT) 50 MG tablet, Take 50 mg by mouth Every 12 (Twelve) Hours., Disp: 60 tablet, Rfl: 5  •  fexofenadine (ALLEGRA) 180 MG tablet, Take 180 mg by mouth Daily., Disp: , Rfl:   •  fluticasone (FLONASE) 50 MCG/ACT nasal spray, 2 sprays into each nostril Daily., Disp: , Rfl:   •  lacosamide (VIMPAT) 200 MG tablet, Take one tab every 12 hours, may take extra tab as directed, Disp: 60 tablet, Rfl: 5.      The patient's relevant past medical, surgical, family and social history were reviewed and updated in Epic as appropriate.       Review of Systems   HENT: Positive for sinus pressure, sinus pain and tinnitus.    Respiratory: Positive for apnea.    Musculoskeletal: Positive for arthralgias and joint swelling.   Allergic/Immunologic: Positive for environmental allergies.   Neurological: Positive for tremors and seizures.   Psychiatric/Behavioral: Positive for confusion, decreased concentration and sleep disturbance.   All other systems reviewed and are " negative.        Objective:    Physical Exam   Constitutional: She is oriented to person, place, and time. She appears well-developed and well-nourished.   HENT:   Head: Normocephalic and atraumatic.   Mouth/Throat: Oropharynx is clear and moist.   Class 4 airway   Eyes: Conjunctivae are normal.   Neck: Neck supple. No thyromegaly present.   Cardiovascular: Normal rate and regular rhythm.   Pulmonary/Chest: Effort normal and breath sounds normal.   Lymphadenopathy:     She has no cervical adenopathy.   Neurological: She is alert and oriented to person, place, and time.   Skin: Skin is warm and dry.   Psychiatric: She has a normal mood and affect. Her behavior is normal. Judgment and thought content normal.   Nursing note and vitals reviewed.        ASSESSMENT/PLAN    Sada was seen today for follow-up.    Diagnoses and all orders for this visit:    Obstructive sleep apnea    Epilepsy undetermined as to focal or generalized, intractable (CMS/[facility])            1. Counseled patient regarding multimodal approach with healthy nutrition, healthy sleep, regular physical activity, social activities, counseling, and medications. Encouraged to practice lateral sleep position. Avoid alcohol and sedatives close to bedtime.  2. Order faxed to DME of patient's choosing I will see patient back in 31 to 90 days to reassess.    I have reviewed the results of my evaluation and impression and discussed my recommendations in detail with the patient.      Signed by  GINNY Olivia    August 30, 2019      CC: Jua nRamon Aldana MD          No ref. provider found

## 2019-09-19 ENCOUNTER — TELEPHONE (OUTPATIENT)
Dept: SLEEP MEDICINE | Facility: HOSPITAL | Age: 54
End: 2019-09-19

## 2019-09-19 NOTE — TELEPHONE ENCOUNTER
[facility] CALLED AND STATES THEY HAVE REPEATEDLY TRIED TO CONTACT PT TO SET UP WITH NO SUCCESS. IF PT CALLS SHE NEEDS TO CALL THEIR OFFICE TO SCHEDUULE

## 2019-10-02 ENCOUNTER — TELEPHONE (OUTPATIENT)
Dept: SLEEP MEDICINE | Facility: HOSPITAL | Age: 54
End: 2019-10-02

## 2019-10-02 NOTE — TELEPHONE ENCOUNTER
PT CANCELLED DME AT [facility] SHE HAS BEEN ADVISED BY FAMILY  TO SEE HER NEUROLOGIST FIRST

## 2019-10-07 ENCOUNTER — TELEPHONE (OUTPATIENT)
Dept: NEUROLOGY | Facility: CLINIC | Age: 54
End: 2019-10-07

## 2019-10-07 NOTE — TELEPHONE ENCOUNTER
----- Message from Kerri Sheriff, [employer] Rep sent at 10/4/2019  4:29 PM EDT -----  Contact: ANGIE RAMÍREZ (PT)  Samuel    PT called/LVM, states she needs the date of her last documented seizure. Her primary care provider entered the wrong date on her medical review.     Please call Angie back: [phone]

## 2019-12-20 ENCOUNTER — OFFICE VISIT (OUTPATIENT)
Dept: NEUROLOGY | Facility: CLINIC | Age: 54
End: 2019-12-20

## 2019-12-20 VITALS
OXYGEN SATURATION: 98 % | DIASTOLIC BLOOD PRESSURE: 62 MMHG | WEIGHT: 160 LBS | BODY MASS INDEX: 28.35 KG/M2 | HEART RATE: 93 BPM | HEIGHT: 63 IN | SYSTOLIC BLOOD PRESSURE: 104 MMHG

## 2019-12-20 DIAGNOSIS — F17.200 SMOKER: ICD-10-CM

## 2019-12-20 DIAGNOSIS — G40.419 EPILEPSY, GENERALIZED TONIC-CLONIC, INTRACTABLE (HCC): Primary | ICD-10-CM

## 2019-12-20 PROCEDURE — 99213 OFFICE O/P EST LOW 20 MIN: CPT | Performed by: PSYCHIATRY & NEUROLOGY

## 2019-12-20 PROCEDURE — 99406 BEHAV CHNG SMOKING 3-10 MIN: CPT | Performed by: PSYCHIATRY & NEUROLOGY

## 2019-12-20 RX ORDER — LACOSAMIDE 200 MG/1
TABLET ORAL
Qty: 60 TABLET | Refills: 5 | Status: SHIPPED | OUTPATIENT
Start: 2019-12-20 | End: 2020-06-24 | Stop reason: SDUPTHER
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
## 2020-02-24 ENCOUNTER — TELEPHONE (OUTPATIENT)
Dept: NEUROLOGY | Facility: CLINIC | Age: 55
End: 2020-02-24

## 2020-02-24 NOTE — TELEPHONE ENCOUNTER
Attempted to schedule patient for 2/26/2020 at 4pm however patient stated that that day and time will not work for her.

## 2020-02-24 NOTE — TELEPHONE ENCOUNTER
Instructed patient to call us to see if we have any cancellations and then we will also check and see for her. Pt verbalized understanding

## 2020-02-24 NOTE — TELEPHONE ENCOUNTER
PATIENT:Sada Juárez   PHONE NUMBER:[phone]    PT. CALLED WITH COMPLAINTS OF UNCONTROLLABLE SHAKES IN HER LEGS AND HAND. HER HANDS SHAKE MORE IN THE MORNINGS AFTER SLEEP.

## 2020-02-24 NOTE — TELEPHONE ENCOUNTER
Her next appt is in June do you want her to have a sooner appt? Next available is toward the end of march. Please advise.

## 2020-05-27 ENCOUNTER — TELEPHONE (OUTPATIENT)
Dept: NEUROLOGY | Facility: CLINIC | Age: 55
End: 2020-05-27

## 2020-06-23 ENCOUNTER — TELEPHONE (OUTPATIENT)
Dept: NEUROLOGY | Facility: CLINIC | Age: 55
End: 2020-06-23

## 2020-06-23 NOTE — TELEPHONE ENCOUNTER
Called and informed pt that we have not received any paperwork for her. Pt states that the fax machine is messed up at work, so she will just bring it in tomorrow.

## 2020-06-23 NOTE — TELEPHONE ENCOUNTER
PT CALLED WANTING TO MAKE SURE HER FAX SHE SENT OVER A FEW MINUTES AGO FOR DR. DUNHAM TO FILL OUT BEFORE HER APPT TOMORROW HAD BEEN RECEIVED. PLEASE ADVISE        BEST CALL BACK- [phone]

## 2020-06-24 ENCOUNTER — OFFICE VISIT (OUTPATIENT)
Dept: NEUROLOGY | Facility: CLINIC | Age: 55
End: 2020-06-24

## 2020-06-24 VITALS
BODY MASS INDEX: 29.95 KG/M2 | OXYGEN SATURATION: 98 % | DIASTOLIC BLOOD PRESSURE: 64 MMHG | HEART RATE: 82 BPM | SYSTOLIC BLOOD PRESSURE: 110 MMHG | WEIGHT: 169 LBS | HEIGHT: 63 IN | TEMPERATURE: 98 F

## 2020-06-24 DIAGNOSIS — G40.419 EPILEPSY, GENERALIZED TONIC-CLONIC, INTRACTABLE (HCC): Primary | ICD-10-CM

## 2020-06-24 PROCEDURE — 99213 OFFICE O/P EST LOW 20 MIN: CPT | Performed by: PSYCHIATRY & NEUROLOGY

## 2020-06-24 RX ORDER — LACOSAMIDE 200 MG/1
TABLET ORAL
Qty: 60 TABLET | Refills: 5 | Status: SHIPPED | OUTPATIENT
Start: 2020-06-24 | End: 2020-12-18 | Stop reason: SDUPTHER
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
## 2020-12-18 ENCOUNTER — OFFICE VISIT (OUTPATIENT)
Dept: NEUROLOGY | Facility: CLINIC | Age: 55
End: 2020-12-18

## 2020-12-18 VITALS
HEART RATE: 83 BPM | OXYGEN SATURATION: 99 % | TEMPERATURE: 97.1 F | DIASTOLIC BLOOD PRESSURE: 88 MMHG | SYSTOLIC BLOOD PRESSURE: 112 MMHG

## 2020-12-18 DIAGNOSIS — G40.419 EPILEPSY, GENERALIZED TONIC-CLONIC, INTRACTABLE (HCC): Primary | ICD-10-CM

## 2020-12-18 PROCEDURE — 99213 OFFICE O/P EST LOW 20 MIN: CPT | Performed by: PSYCHIATRY & NEUROLOGY

## 2020-12-18 RX ORDER — LACOSAMIDE 200 MG/1
TABLET, FILM COATED ORAL
Qty: 60 TABLET | Refills: 5 | Status: SHIPPED | OUTPATIENT
Start: 2020-12-18 | End: 2021-04-23

## 2020-12-18 RX ORDER — BRIVARACETAM 50 MG/1
50 TABLET, FILM COATED ORAL EVERY 12 HOURS
Qty: 60 TABLET | Refills: 5 | Status: SHIPPED | OUTPATIENT
Start: 2020-12-18 | End: 2021-06-01 | Stop reason: SDUPTHER
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
## 2021-02-01 ENCOUNTER — TELEPHONE (OUTPATIENT)
Dept: NEUROLOGY | Facility: CLINIC | Age: 56
End: 2021-02-01

## 2021-02-01 NOTE — TELEPHONE ENCOUNTER
Caller: PT    Relationship: SELF    Best call back number: [phone]    What form or medical record are you requesting: PA FORM FOR BRIVIACT AND VIMPAT. PT STATES THE PHARMACY FAXED OVER THE FORM A FEW MINUTES AGO BUT IT WAS ELECTRONICALLY SENT LAST THURSDAY    Who is requesting this form or medical record from you: PHARMACY    How would you like to receive the form or medical records (pick-up, mail, fax): FAX      Timeframe paperwork needed: ASAP    Additional notes: PT ONLY HAS ONE DOSAGE LEFT OF MEDICATION. SHE STATES A PA IS NEEDED FOR HER MEDICATION AND SHE IS NEEDING IT COMPLETED ASAP. PLEASE ADVISE PT ON STATUS OF PA.

## 2021-02-01 NOTE — TELEPHONE ENCOUNTER
Spoke with patient on the phone and she stated that she accidentally took 3 doses of her seizure medications yesterday. She took one dose at 8:30am, one dose at 8:30pm and another dose(on accident) at 11:20pm, she was wondering how she should take those medications today.

## 2021-02-01 NOTE — TELEPHONE ENCOUNTER
is calling about his wife,Amna. She is having heart palpitations and he is concerned. Please call him back at [phone] thanks.   PT HAS CALLED STATING THAT THE PHARMACY HAS INFORMED HER THAT THE PA FOR HER VIMPAT MEDICATION HAS BEEN DENIED. SHE STATES SHE IS COMPLETELY OUT OF HER MEDICATION AND DOES NOT KNOW WHAT TO DO. PT WAS CRYING AND IN DISTRESS WHEN CALLING.    PT CAN BE REACHED AT [phone]    PLEASE REVIEW AND ADVISE.

## 2021-02-02 NOTE — TELEPHONE ENCOUNTER
I have called the drug rep [facility] [place], [phone] and have asked him to call us back.  You can also try to call him and give him the insurance information and see if he can help us.

## 2021-02-02 NOTE — TELEPHONE ENCOUNTER
Provider:   Caller: PT  Relationship to Patient: SELF    Phone Number: [phone] OK TO [facility]      Reason for Call: PT CALLED IN TODAY REGARDING HER MEDICATION STATING SHE WAS ABLE TO GET AN EMERGENCY THREE DAY SUPPLY OF MEDICATION BUT STATES HER PA WAS DENIED. I INFORMED HER THAT THE MA STARTED THE APPEAL THIS MORNING AND SHE STATED UNDERSTANDING.

## 2021-02-03 NOTE — TELEPHONE ENCOUNTER
ANGIE RAMÍREZ   [phone]    THE PT CALLED IN TODAY BECAUSE SHE IS JUST WANTING TO GET AN UPDATE ON THE PA FOR THE BRIVIACT AND VIMPAT. PLEASE GIVE HER A CALL WHEN POSSIBLE

## 2021-02-03 NOTE — TELEPHONE ENCOUNTER
PT CALLING BACK TO INFORM DR DUNHAM THAT SHE WAS ABLE TO GET A 30 DAY SUPPLY OF lacosamide (Vimpat) 200 MG tablet  AND Brivaracetam (Briviact) 50 MG tablet WITH HER DISCOUNT CARD   THANK YOU ANY QUESTIONS PTS CALL BACK [phone]

## 2021-03-04 ENCOUNTER — TELEPHONE (OUTPATIENT)
Dept: NEUROLOGY | Facility: CLINIC | Age: 56
End: 2021-03-04

## 2021-03-04 NOTE — TELEPHONE ENCOUNTER
Yes we can fill out the assistance as soon as she gets them.  I have already written her prescriptions.

## 2021-03-04 NOTE — TELEPHONE ENCOUNTER
Provider:     Caller: PT    Relationship to Patient: SELF    Pharmacy: [facility] IN [place], [place]    Phone Number: [phone]    Reason for Call: PT CALLED IN TODAY IN REGARDS TO HER VIMPAT AND BRIVACT AND SHE STATED HER INSURANCE IS NO LONGER COVERING EITHER. SHE THEN STATED SHE HAS A SAVINGS CARD AND IT WORKED LAST TIME WHEN SHE NEEDED TO FILL THEM. SHE STATED THIS TIME THAT IT WAS ASKING FOR A $800 COPAY. SHE HAS CONTACTED AN ASSISTANCE PROGRAM AND IS GOING TO APPLY FOR THIS. SHE WAS JUST WANTING TO LET  THAT SHE IS STILL HAVING THE ISSUES WITH BOTH MEDICATIONS. SHE STATED SHE IS GOING TO BE DROPPING OFF THE APPLICATION SOMETIME TODAY OR TOMORROW MORNING AT THE LATEST. SHE STATED THE OFFICE WILL NEED TO FAX IT DIRECTLY TO THE ASSISTANCE PEOPLE. SHE STATED THE ASSISTANCE PROGRAM WILL NEED A SCRIPT SENT TO THEM  AS WELL. SHE IS JUST STRESSING THE NEED OF THIS SITUATION TO GET RESOLVED BECAUSE SHE STATED THIS MEDICATION HAS SAVED HER LIFE AND ALLOWED HER TO LIVE A MORE NORMAL LIFE.     PLEASE REVIEW AND ADVISE WITH ANY QUESTIONS

## 2021-03-11 ENCOUNTER — TELEPHONE (OUTPATIENT)
Dept: NEUROLOGY | Facility: CLINIC | Age: 56
End: 2021-03-11

## 2021-03-11 NOTE — TELEPHONE ENCOUNTER
Called patient to see what the status was on the Vimpat assistance paperwork. She will be stopping by the office on 03/12/2021 to drop it off. As far as the [employer] insurance is going to cover that one.

## 2021-04-22 ENCOUNTER — OFFICE VISIT (OUTPATIENT)
Dept: NEUROLOGY | Facility: CLINIC | Age: 56
End: 2021-04-22

## 2021-04-22 ENCOUNTER — TELEPHONE (OUTPATIENT)
Dept: NEUROLOGY | Facility: CLINIC | Age: 56
End: 2021-04-22

## 2021-04-22 VITALS
DIASTOLIC BLOOD PRESSURE: 76 MMHG | HEART RATE: 74 BPM | OXYGEN SATURATION: 97 % | HEIGHT: 63 IN | WEIGHT: 160.6 LBS | TEMPERATURE: 97.3 F | BODY MASS INDEX: 28.46 KG/M2 | SYSTOLIC BLOOD PRESSURE: 122 MMHG

## 2021-04-22 DIAGNOSIS — G40.419 EPILEPSY, GENERALIZED TONIC-CLONIC, INTRACTABLE (HCC): Primary | ICD-10-CM

## 2021-04-22 PROCEDURE — 99214 OFFICE O/P EST MOD 30 MIN: CPT | Performed by: PSYCHIATRY & NEUROLOGY

## 2021-04-22 RX ORDER — DIVALPROEX SODIUM 250 MG/1
TABLET, EXTENDED RELEASE ORAL
Qty: 120 TABLET | Refills: 2 | Status: SHIPPED | OUTPATIENT
Start: 2021-04-22 | End: 2021-07-28

## 2021-04-22 NOTE — TELEPHONE ENCOUNTER
Caller: MarquitaSada    Relationship: Self    Best call back number: [phone]    What medications are you currently taking:   Current Outpatient Medications on File Prior to Visit   Medication Sig Dispense Refill   • Brivaracetam (Briviact) 50 MG tablet Take 1 tablet by mouth Every 12 (Twelve) Hours. 60 tablet 5   • divalproex (Depakote ER) 250 MG 24 hr tablet Take 1 tablet twice a day for 1 week then increase to 2 tablets twice a day thereafter 120 tablet 2   • fexofenadine (ALLEGRA) 180 MG tablet Take 180 mg by mouth Daily.     • fluticasone (FLONASE) 50 MCG/ACT nasal spray 2 sprays into each nostril Daily.     • lacosamide (Vimpat) 200 MG tablet Take one tab every 12 hours, may take extra tab as directed 60 tablet 5     No current facility-administered medications on file prior to visit.        When did you start taking these medications: NA    Which medication are you concerned about: VIMPAT    Who prescribed you this medication:     What are your concerns: PATIENT STATES APPEAL ON RX WAS DENIED IN February AND CANCELLED IN MARCH. PATIENT STATES THERE IS A NUMBER  CAN CALL FOR URGENT REQUEST [phone] AND SECOND NUMBER WHICH WOULD BE BETTER NUMBER [phone]    How long have you been taking these medications: NA    How long have you had these concerns: NA
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
## 2021-04-23 ENCOUNTER — TELEPHONE (OUTPATIENT)
Dept: NEUROLOGY | Facility: CLINIC | Age: 56
End: 2021-04-23

## 2021-04-23 DIAGNOSIS — G40.419 EPILEPSY, GENERALIZED TONIC-CLONIC, INTRACTABLE (HCC): Primary | ICD-10-CM

## 2021-04-23 RX ORDER — LACOSAMIDE 200 MG/1
200 TABLET, FILM COATED ORAL EVERY 12 HOURS SCHEDULED
Qty: 60 TABLET | Refills: 5 | Status: SHIPPED | OUTPATIENT
Start: 2021-04-23 | End: 2021-06-01 | Stop reason: SDUPTHER

## 2021-04-23 NOTE — TELEPHONE ENCOUNTER
Please advise.    Patient called yesterday afternoon stating in order to use the discount card a new Rx for Vimpat was needed.

## 2021-04-23 NOTE — TELEPHONE ENCOUNTER
Spoke to patient.     Román told original appeal was cancelled, and stated provider to call peer to peer number.

## 2021-04-23 NOTE — TELEPHONE ENCOUNTER
PATIENT STATES THAT SHE TALKED WITH [employer] FINANCIAL ASSISTANCE YESTERDAY AND SHE STATED SHE NEEDS AN APPEAL FOR THE FINANCIAL ASSISTANCE NOW AS WELL. PLEASE ADVISE.

## 2021-04-27 ENCOUNTER — PRIOR AUTHORIZATION (OUTPATIENT)
Dept: NEUROLOGY | Facility: CLINIC | Age: 56
End: 2021-04-27

## 2021-04-27 NOTE — TELEPHONE ENCOUNTER
Called pts insurance to get Vimpat approved. Stated we needed a PA. One was started while on the phone, and was told we would hear something within 24-72 hours.

## 2021-04-28 ENCOUNTER — PRIOR AUTHORIZATION (OUTPATIENT)
Dept: NEUROLOGY | Facility: CLINIC | Age: 56
End: 2021-04-28

## 2021-06-01 DIAGNOSIS — G40.419 EPILEPSY, GENERALIZED TONIC-CLONIC, INTRACTABLE (HCC): ICD-10-CM

## 2021-06-01 RX ORDER — LACOSAMIDE 200 MG/1
200 TABLET, FILM COATED ORAL EVERY 12 HOURS SCHEDULED
Qty: 60 TABLET | Refills: 1 | Status: SHIPPED | OUTPATIENT
Start: 2021-06-01 | End: 2021-07-28 | Stop reason: SDUPTHER

## 2021-06-01 NOTE — TELEPHONE ENCOUNTER
Approved refilled 30 day supply with 1 RF. Ru reviewed and appropriate. Should keep follow up with Dr. Baker on 6/24/2021 for additional refills. Thanks, GINNY Vásquez

## 2021-06-01 NOTE — TELEPHONE ENCOUNTER
Caller: Sada Juárez    Relationship: Self    Best call back number:   Medication needed: [phone]    Requested Prescriptions     Pending Prescriptions Disp Refills   • lacosamide (Vimpat) 200 MG tablet 60 tablet 5     Sig: Take 1 tablet by mouth Every 12 (Twelve) Hours.   • brivaracetam (Briviact) 50 MG tablet 60 tablet 5     Sig: Take 1 tablet by mouth Every 12 (Twelve) Hours.       When do you need the refill by:ASAP    Does the patient have less than a 3 day supply:  [] Yes  [] No    What is the patient's preferred pharmacy:    [facility] [address] [facility] [facility] [phone] [facility] [phone]   [phone]

## 2021-07-28 ENCOUNTER — OFFICE VISIT (OUTPATIENT)
Dept: NEUROLOGY | Facility: CLINIC | Age: 56
End: 2021-07-28

## 2021-07-28 VITALS
BODY MASS INDEX: 29.41 KG/M2 | OXYGEN SATURATION: 98 % | WEIGHT: 166 LBS | HEART RATE: 87 BPM | DIASTOLIC BLOOD PRESSURE: 68 MMHG | HEIGHT: 63 IN | SYSTOLIC BLOOD PRESSURE: 100 MMHG

## 2021-07-28 DIAGNOSIS — G40.419 EPILEPSY, GENERALIZED TONIC-CLONIC, INTRACTABLE (HCC): ICD-10-CM

## 2021-07-28 PROCEDURE — 99213 OFFICE O/P EST LOW 20 MIN: CPT | Performed by: PSYCHIATRY & NEUROLOGY

## 2021-07-28 RX ORDER — LACOSAMIDE 200 MG/1
200 TABLET, FILM COATED ORAL EVERY 12 HOURS SCHEDULED
Qty: 60 TABLET | Refills: 5 | Status: SHIPPED | OUTPATIENT
Start: 2021-07-28 | End: 2022-01-12 | Stop reason: SDUPTHER
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
## 2022-01-12 DIAGNOSIS — G40.419 EPILEPSY, GENERALIZED TONIC-CLONIC, INTRACTABLE: ICD-10-CM

## 2022-01-12 NOTE — TELEPHONE ENCOUNTER
Caller: MarquitaSada    Relationship: Self    Best call back number: [phone]    Requested Prescriptions:   Requested Prescriptions     Pending Prescriptions Disp Refills   • brivaracetam (Briviact) 50 MG tablet 60 tablet 5     Sig: Take 1 tablet by mouth Every 12 (Twelve) Hours.   • lacosamide (Vimpat) 200 MG tablet 60 tablet 5     Sig: Take 1 tablet by mouth Every 12 (Twelve) Hours.        [facility]    Additional details provided by patient: PT STATES SHE JUST FILLED HER LAST REFILL AND IT SHOULD CARRY HER UNTIL 01/27/22, BUT THAT SHE HAS NO REFILLS LEFT AFTER 01/27/22.    Does the patient have less than a 3 day supply:  [] Yes  [x] No    Warren Santiago Rep   01/12/22 09:36 EST

## 2022-01-14 RX ORDER — LACOSAMIDE 200 MG/1
200 TABLET, FILM COATED ORAL EVERY 12 HOURS SCHEDULED
Qty: 60 TABLET | Refills: 5 | Status: SHIPPED | OUTPATIENT
Start: 2022-01-14 | End: 2022-04-13 | Stop reason: SDUPTHER

## 2022-03-03 ENCOUNTER — TELEPHONE (OUTPATIENT)
Dept: NEUROLOGY | Facility: CLINIC | Age: 57
End: 2022-03-03

## 2022-03-03 NOTE — TELEPHONE ENCOUNTER
Caller: ANGIE    Relationship: SELF    Best call back number: [phone]    What form or medical record are you requesting: PA FOR BROOKIACT    Who is requesting this form or medical record from you: PHARMACY    Timeframe paperwork needed: ASAP    Additional notes: PT STATES THAT SHE ONLY HAS ENOUGH LEFT TO GET THROUGH TOMORROW.

## 2022-03-03 NOTE — TELEPHONE ENCOUNTER
Have restarted PA as previous o KEY: [id number]    Spoke to pharmacy who stated that the medication was approved last month. I called pt to inform. She stated that she has a letter at home stating that the medication was approved until April of 2022 and that she will try and find it. I explained to her that if I do not have an answer from her insurance by today, then I will call them tomorrow to see how we can get this approved. Pt voiced understanding.

## 2022-03-04 NOTE — TELEPHONE ENCOUNTER
Checked covermymeds this morning and PA for Briviact was approved from 03/03/2022 - 03/03/2023. I called and informed pt. She stated that a PA for Vimpat will probably be due in April.

## 2022-04-13 ENCOUNTER — OFFICE VISIT (OUTPATIENT)
Dept: NEUROLOGY | Facility: CLINIC | Age: 57
End: 2022-04-13

## 2022-04-13 VITALS
DIASTOLIC BLOOD PRESSURE: 80 MMHG | BODY MASS INDEX: 29.41 KG/M2 | WEIGHT: 166 LBS | SYSTOLIC BLOOD PRESSURE: 110 MMHG | OXYGEN SATURATION: 98 % | HEART RATE: 69 BPM | HEIGHT: 63 IN

## 2022-04-13 DIAGNOSIS — G40.419 EPILEPSY, GENERALIZED TONIC-CLONIC, INTRACTABLE: Primary | ICD-10-CM

## 2022-04-13 PROCEDURE — 99213 OFFICE O/P EST LOW 20 MIN: CPT | Performed by: PSYCHIATRY & NEUROLOGY

## 2022-04-13 RX ORDER — LACOSAMIDE 200 MG/1
200 TABLET, FILM COATED ORAL EVERY 12 HOURS SCHEDULED
Qty: 60 TABLET | Refills: 5 | Status: SHIPPED | OUTPATIENT
Start: 2022-04-13 | End: 2022-06-01 | Stop reason: SDUPTHER
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
## 2022-05-31 ENCOUNTER — TELEPHONE (OUTPATIENT)
Dept: NEUROLOGY | Facility: CLINIC | Age: 57
End: 2022-05-31

## 2022-06-01 DIAGNOSIS — G40.419 EPILEPSY, GENERALIZED TONIC-CLONIC, INTRACTABLE: ICD-10-CM

## 2022-06-01 RX ORDER — LACOSAMIDE 200 MG/1
200 TABLET ORAL EVERY 12 HOURS SCHEDULED
Qty: 60 TABLET | Refills: 5 | Status: SHIPPED | OUTPATIENT
Start: 2022-06-01 | End: 2022-09-30 | Stop reason: SDUPTHER

## 2022-06-01 NOTE — TELEPHONE ENCOUNTER
Yes there is a generic and it came out just a few months ago.  Tell the patient I have sent the generic to her [facility] pharmacy

## 2022-06-01 NOTE — TELEPHONE ENCOUNTER
PATIENT CALLED BACK TO CHECK ON STATUS OF HER REQUEST.    PLEASE CALL PATIENT BACK ASAP    PATIENT IS NERVOUS THAT SHE WILL NOT HAVE HER MEDICATION

## 2022-06-02 NOTE — TELEPHONE ENCOUNTER
Notified Megan. She does not think they ordered the generic and have her brand name ready so she will use savings card to get regular vimpat this month and then start the generic next month after pharmacy gets this in stock. Sounds good. Thanks!

## 2022-09-30 ENCOUNTER — TELEPHONE (OUTPATIENT)
Dept: NEUROLOGY | Facility: CLINIC | Age: 57
End: 2022-09-30

## 2022-09-30 DIAGNOSIS — G40.419 EPILEPSY, GENERALIZED TONIC-CLONIC, INTRACTABLE: ICD-10-CM

## 2022-10-03 RX ORDER — LACOSAMIDE 200 MG/1
200 TABLET ORAL EVERY 12 HOURS SCHEDULED
Qty: 180 TABLET | Refills: 1 | Status: SHIPPED | OUTPATIENT
Start: 2022-10-03

## 2022-10-27 ENCOUNTER — TELEPHONE (OUTPATIENT)
Dept: NEUROLOGY | Facility: CLINIC | Age: 57
End: 2022-10-27

## 2022-10-27 DIAGNOSIS — G40.419 EPILEPSY, GENERALIZED TONIC-CLONIC, INTRACTABLE: ICD-10-CM

## 2022-10-27 RX ORDER — BRIVARACETAM 50 MG/1
TABLET, FILM COATED ORAL
Qty: 60 TABLET | Refills: 5 | Status: SHIPPED | OUTPATIENT
Start: 2022-10-27

## 2023-03-30 ENCOUNTER — TELEPHONE (OUTPATIENT)
Dept: NEUROLOGY | Facility: CLINIC | Age: 58
End: 2023-03-30

## 2023-03-30 NOTE — TELEPHONE ENCOUNTER
Caller: LEONA  Relationship to Patient: SELF  Phone Number: [phone]    Reason for Call:PT STATES SHE WENT TO GO REFILL THE Briviact 50 MG tablet AND THEY ADVISED HER INSURANCE DENIED HER REFILL. SHE STATES SHE ONLY HAS ENOUGH MEDICATION UNTIL 4-2-23

## 2023-03-31 ENCOUNTER — TELEPHONE (OUTPATIENT)
Dept: NEUROLOGY | Facility: CLINIC | Age: 58
End: 2023-03-31

## 2023-04-05 NOTE — TELEPHONE ENCOUNTER
Spoke to Megan and insurance denied this due to it being a brand name. Patient states she has not tried generic medication of this but that she would be willing to. States she has been doing just find on generic vimpat as well. Patient used [employer] card to  this medication so has a months supply. Would patient tolerate generic version of this medication for insurance purposes? FYI patient has office visit scheduled with you for 4/13/23

## 2023-04-13 ENCOUNTER — OFFICE VISIT (OUTPATIENT)
Dept: NEUROLOGY | Facility: CLINIC | Age: 58
End: 2023-04-13
Payer: COMMERCIAL

## 2023-04-13 ENCOUNTER — PRIOR AUTHORIZATION (OUTPATIENT)
Dept: NEUROLOGY | Facility: CLINIC | Age: 58
End: 2023-04-13
Payer: COMMERCIAL

## 2023-04-13 VITALS
HEIGHT: 63 IN | OXYGEN SATURATION: 96 % | BODY MASS INDEX: 29.41 KG/M2 | HEART RATE: 76 BPM | DIASTOLIC BLOOD PRESSURE: 58 MMHG | SYSTOLIC BLOOD PRESSURE: 100 MMHG | WEIGHT: 166 LBS

## 2023-04-13 DIAGNOSIS — G40.419 EPILEPSY, GENERALIZED TONIC-CLONIC, INTRACTABLE: Primary | ICD-10-CM

## 2023-04-13 PROCEDURE — 99213 OFFICE O/P EST LOW 20 MIN: CPT | Performed by: PSYCHIATRY & NEUROLOGY

## 2023-04-13 RX ORDER — LACOSAMIDE 200 MG/1
200 TABLET ORAL EVERY 12 HOURS SCHEDULED
Qty: 180 TABLET | Refills: 3 | Status: SHIPPED | OUTPATIENT
Start: 2023-04-13
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
## 2023-10-25 DIAGNOSIS — G40.419 EPILEPSY, GENERALIZED TONIC-CLONIC, INTRACTABLE: ICD-10-CM

## 2023-10-25 NOTE — TELEPHONE ENCOUNTER
"Caller: MarquitaSada \"Megan\"    Relationship: Self    Best call back number: [phone]    Requested Prescriptions:   Requested Prescriptions     Pending Prescriptions Disp Refills    brivaracetam (Briviact) 50 MG tablet 60 tablet 5     Sig: Take 1 tablet by mouth Every 12 (Twelve) Hours.    lacosamide (Vimpat) 200 MG tablet 180 tablet 3     Sig: Take 1 tablet by mouth Every 12 (Twelve) Hours.        Pharmacy where request should be sent: [facility] [id number] [address] [phone] [facility] [phone] FX     Last office visit with prescribing clinician: 4/13/2023   Last telemedicine visit with prescribing clinician: Visit date not found   Next office visit with prescribing clinician: 4/11/2024     Additional details provided by patient: PT WILL BE OUT OF THE MEDICATION TOMORROW ON 10-26-23    Does the patient have less than a 3 day supply:  [x] Yes  [] No    Would you like a call back once the refill request has been completed: [x] Yes [] No    If the office needs to give you a call back, can they leave a voicemail: [x] Yes [] No    Warren Harris   10/25/23 12:11 EDT           "

## 2023-10-26 RX ORDER — LACOSAMIDE 200 MG/1
200 TABLET ORAL EVERY 12 HOURS SCHEDULED
Qty: 180 TABLET | Refills: 3 | Status: SHIPPED | OUTPATIENT
Start: 2023-10-26

## 2023-11-13 ENCOUNTER — TELEPHONE (OUTPATIENT)
Dept: NEUROLOGY | Facility: CLINIC | Age: 58
End: 2023-11-13

## 2023-11-13 NOTE — TELEPHONE ENCOUNTER
PATIENT SEEKING SOONER APPT - SHE HAS STATE FORMS THAN NEED TO BE COMPLETED BUT HAS NOT SEEN PROVIDER IN LAST 6 MONTHS SO SHE NEEDS AN APPT TO DO SO.  SHE IS ASKING FOR 12/1/23 APPT.      PLEASE ADVISE PATIENT    THANK YOU

## 2023-11-20 ENCOUNTER — OFFICE VISIT (OUTPATIENT)
Dept: NEUROLOGY | Facility: CLINIC | Age: 58
End: 2023-11-20
Payer: COMMERCIAL

## 2023-11-20 VITALS
HEIGHT: 63 IN | SYSTOLIC BLOOD PRESSURE: 112 MMHG | WEIGHT: 170 LBS | OXYGEN SATURATION: 97 % | BODY MASS INDEX: 30.12 KG/M2 | HEART RATE: 87 BPM | DIASTOLIC BLOOD PRESSURE: 80 MMHG

## 2023-11-20 DIAGNOSIS — G40.419 EPILEPSY, GENERALIZED TONIC-CLONIC, INTRACTABLE: Primary | ICD-10-CM

## 2023-11-20 PROCEDURE — 99213 OFFICE O/P EST LOW 20 MIN: CPT | Performed by: PSYCHIATRY & NEUROLOGY
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
## 2023-12-27 ENCOUNTER — TELEPHONE (OUTPATIENT)
Dept: NEUROLOGY | Facility: CLINIC | Age: 58
End: 2023-12-27
Payer: COMMERCIAL

## 2023-12-27 NOTE — TELEPHONE ENCOUNTER
Caller: ANGIE     Relationship:     Best call back number: [phone] CAN LEAVE DETAILED MESS ON VM    What was the call regarding: PT GAVE YOU THE WRONG DATE OF LAST SEIZURE FOR P.W SHE HAD YOU FILL OU  , WANTS TO DROP OFF P.W  FOR DR DUNHAM TO INITIAL AND PUT CORRECT DATE IN . IS THIS OK?     Is it okay if the provider responds through MyChart:CALL     PLEASE CALL TO ADVISE

## 2023-12-27 NOTE — TELEPHONE ENCOUNTER
FERNANDOM with Megan. I don't see a problem with her bringing in her ppw to change the date. Advised Dr. Baker won't be physically in office until the 8th but she can drop the ppw off anytime.       Asked they call back if any questions or concerns.       Doug BOOKER

## 2024-01-11 ENCOUNTER — TELEPHONE (OUTPATIENT)
Dept: NEUROLOGY | Facility: CLINIC | Age: 59
End: 2024-01-11
Payer: COMMERCIAL

## 2024-01-11 NOTE — TELEPHONE ENCOUNTER
"Caller: Sada Juárez \"Megan\"    Relationship: Self    Best call back number: [phone]    What form or medical record are you requesting: LICENSE PAPERWORK FOR STATE    Who is requesting this form or medical record from you: [place] [employer]    How would you like to receive the form or medical records (pick-up, mail, fax): FAX  If fax, what is the fax number: ON THE PAPERWORK    Timeframe paperwork needed: TODAY    Additional notes: PATIENT FAXED THE PAPERWORK TO OUR OFFICE YESTERDAY AND INCLUDED LAST SEIZURE DATE THAT SHOULD'VE BEEN ON FORMS ON THE COVER SHEET. DID WE RECEIVE THIS PAPERWORK? CAN WE HAVE IT UPDATED AND RETURNED TO THE STATE BY TODAY?    "

## 2024-04-11 ENCOUNTER — TELEPHONE (OUTPATIENT)
Dept: NEUROLOGY | Facility: CLINIC | Age: 59
End: 2024-04-11

## 2024-04-11 ENCOUNTER — OFFICE VISIT (OUTPATIENT)
Dept: NEUROLOGY | Facility: CLINIC | Age: 59
End: 2024-04-11
Payer: COMMERCIAL

## 2024-04-11 VITALS
SYSTOLIC BLOOD PRESSURE: 108 MMHG | OXYGEN SATURATION: 97 % | WEIGHT: 172 LBS | DIASTOLIC BLOOD PRESSURE: 68 MMHG | HEIGHT: 63 IN | BODY MASS INDEX: 30.48 KG/M2 | HEART RATE: 67 BPM

## 2024-04-11 DIAGNOSIS — G40.419 EPILEPSY, GENERALIZED TONIC-CLONIC, INTRACTABLE: Primary | ICD-10-CM

## 2024-04-11 PROCEDURE — 99213 OFFICE O/P EST LOW 20 MIN: CPT | Performed by: PSYCHIATRY & NEUROLOGY

## 2024-04-11 RX ORDER — LACOSAMIDE 200 MG/1
200 TABLET ORAL EVERY 12 HOURS SCHEDULED
Qty: 180 TABLET | Refills: 3 | Status: SHIPPED | OUTPATIENT
Start: 2024-04-11
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
## 2024-04-24 DIAGNOSIS — G40.419 EPILEPSY, GENERALIZED TONIC-CLONIC, INTRACTABLE: ICD-10-CM

## 2024-04-24 NOTE — TELEPHONE ENCOUNTER
Rx Refill Note  Requested Prescriptions     Pending Prescriptions Disp Refills    brivaracetam (Briviact) 50 MG tablet 180 tablet 3     Sig: Take 1 tablet by mouth Every 12 (Twelve) Hours.      Last filled:4/11/24 3 refills  Last office visit with prescribing clinician: 4/11/2024      Next office visit with prescribing clinician: 4/14/2025     RAJIV PUCKETT  04/24/24, 12:28 EDT    Duplicate-denied

## 2024-04-24 NOTE — TELEPHONE ENCOUNTER
"    Caller: Sada Juárez \"Megan\"    Relationship: Self    Best call back number: [phone]    Requested Prescriptions:   Requested Prescriptions     Pending Prescriptions Disp Refills    brivaracetam (Briviact) 50 MG tablet 180 tablet 3     Sig: Take 1 tablet by mouth Every 12 (Twelve) Hours.        Pharmacy where request should be sent: [facility] [id number] [address] [phone] [facility] [phone]      Last office visit with prescribing clinician: 4/11/2024   Last telemedicine visit with prescribing clinician: Visit date not found   Next office visit with prescribing clinician: 4/14/2025     Additional details provided by patient: PATIENT WILL BE OUT TOMORROW MORNING; INSURANCE HAS DENIED MEDICATION; PLEASE CONTACT INSURANCE FOR A PRIOR AUTHORIZATION.     Does the patient have less than a 3 day supply:  [x] Yes  [] No    Would you like a call back once the refill request has been completed: [x] Yes [] No    If the office needs to give you a call back, can they leave a voicemail: [x] Yes [] No    Warren Lomas Rep   04/24/24 09:51 EDT         "

## 2024-04-25 ENCOUNTER — PRIOR AUTHORIZATION (OUTPATIENT)
Dept: NEUROLOGY | Facility: CLINIC | Age: 59
End: 2024-04-25
Payer: COMMERCIAL

## 2024-04-25 ENCOUNTER — TELEPHONE (OUTPATIENT)
Dept: NEUROLOGY | Facility: CLINIC | Age: 59
End: 2024-04-25
Payer: COMMERCIAL

## 2024-04-25 NOTE — TELEPHONE ENCOUNTER
Provider: ROLY    Caller: ANGIE    Pharmacy: [facility] [id number]    Phone Number: [phone]     Reason for Call:PT CALLED AND IS WANTING TO KNOW IF THERE IS ANY UPDATE ON HER BRIVIACT MEDICATION AS PT IS OUT. PT STATES THAT PHARMACY TOLD PT  INSURANCE DENIED MEDICATION.    PLEASE REVIEW AND ADVISE.  THANK YOU

## 2024-04-25 NOTE — TELEPHONE ENCOUNTER
Notified patient that PA has been approved.  I tried calling pharmacy to let them know but they are out to lunch.  Will try again shortly.  She was very appreciative.

## 2024-04-25 NOTE — TELEPHONE ENCOUNTER
Called to let Pharmacy know that PA had been approved.   Vital Signs Last 24 Hrs  T(C): 36.6 (12 Nov 2019 11:06), Max: 36.6 (12 Nov 2019 11:06)  T(F): 97.9 (12 Nov 2019 11:06), Max: 97.9 (12 Nov 2019 11:06)  HR: 95 (12 Nov 2019 11:06) (76 - 95)  BP: 153/92 (12 Nov 2019 11:06) (129/76 - 153/92)  BP(mean): --  RR: 17 (12 Nov 2019 11:06) (17 - 18)  SpO2: 99% (12 Nov 2019 11:06) (95% - 100%)

## 2024-04-25 NOTE — TELEPHONE ENCOUNTER
[id number]    Outcome  Approved today  Your PA request has been approved. Additional information will be provided in the approval communication. (Message 0983)  Authorization Expiration Date: 4/24/2025

## 2024-09-30 DIAGNOSIS — G40.419 EPILEPSY, GENERALIZED TONIC-CLONIC, INTRACTABLE: ICD-10-CM

## 2024-09-30 RX ORDER — LACOSAMIDE 200 MG/1
200 TABLET ORAL EVERY 12 HOURS SCHEDULED
Qty: 180 TABLET | Refills: 0 | Status: SHIPPED | OUTPATIENT
Start: 2024-09-30

## 2024-09-30 NOTE — TELEPHONE ENCOUNTER
"    Caller: Sada Juárez KULDEEP \"Megan\"    Relationship: Self    Best call back number: [phone]    Requested Prescriptions:   Requested Prescriptions     Pending Prescriptions Disp Refills    lacosamide (Vimpat) 200 MG tablet 180 tablet 3     Sig: Take 1 tablet by mouth Every 12 (Twelve) Hours.    brivaracetam (Briviact) 50 MG tablet 180 tablet 3     Sig: Take 1 tablet by mouth Every 12 (Twelve) Hours.        Pharmacy where request should be sent: [facility] [id number] [address] [phone] [facility] [phone] FX     Last office visit with prescribing clinician: 4/11/2024   Last telemedicine visit with prescribing clinician: Visit date not found   Next office visit with prescribing clinician: 4/14/2025     Additional details provided by patient: PATIENT HAS 2 WEEKS LEFT OF MEDS. NEED UPDATE RX DUE TO LAST REFILLS     Does the patient have less than a 3 day supply:  [] Yes  [x] No    Would you like a call back once the refill request has been completed: [] Yes [x] No    If the office needs to give you a call back, can they leave a voicemail: [x] Yes [] No    Warren Herrera   09/30/24 09:15 EDT     "

## 2024-09-30 NOTE — TELEPHONE ENCOUNTER
Rx Refill Note  Requested Prescriptions     Pending Prescriptions Disp Refills    lacosamide (Vimpat) 200 MG tablet 180 tablet 3     Sig: Take 1 tablet by mouth Every 12 (Twelve) Hours.    brivaracetam (Briviact) 50 MG tablet 180 tablet 3     Sig: Take 1 tablet by mouth Every 12 (Twelve) Hours.      Last filled:24 90ds 3 refill  Last office visit with prescribing clinician: 2024      Next office visit with prescribing clinician: 2025     Last filled:24 90ds 3 refill  Last office visit with prescribing clinician: 2024      Next office visit with prescribing clinician: 2025     RAJIV PUCKETT  24, 11:08 EDT    Verified with pharmacy that scripts are going to  in 10 days.    Pending to provider

## 2025-01-10 DIAGNOSIS — G40.419 EPILEPSY, GENERALIZED TONIC-CLONIC, INTRACTABLE: ICD-10-CM

## 2025-01-10 NOTE — TELEPHONE ENCOUNTER
"  Caller: MarquitaSada \"Megan\"    Relationship: Self    Best call back number: [phone]     Requested Prescriptions:   Requested Prescriptions     Pending Prescriptions Disp Refills    lacosamide (Vimpat) 200 MG tablet 180 tablet 0     Sig: Take 1 tablet by mouth Every 12 (Twelve) Hours.    brivaracetam (Briviact) 50 MG tablet 180 tablet 0     Sig: Take 1 tablet by mouth Every 12 (Twelve) Hours.        Pharmacy where request should be sent: [facility] [id number] [address] [phone] [facility] [phone]      Last office visit with prescribing clinician: Visit date not found   Last telemedicine visit with prescribing clinician: Visit date not found   Next office visit with prescribing clinician: Visit date not found     Additional details provided by patient: A WEEK LEFT BUT PATIENT IS REQUESTING FOR A 3 MONTH SUPPLY FOR REFILL.     Does the patient have less than a 3 day supply:  [] Yes  [x] No    Would you like a call back once the refill request has been completed: [] Yes [x] No    If the office needs to give you a call back, can they leave a voicemail: [] Yes [x] No    Warren Lomas Rep   01/10/25 15:05 EST       "

## 2025-01-13 NOTE — TELEPHONE ENCOUNTER
Rx Refill Note  Requested Prescriptions     Pending Prescriptions Disp Refills    lacosamide (Vimpat) 200 MG tablet 180 tablet 0     Sig: Take 1 tablet by mouth Every 12 (Twelve) Hours.    brivaracetam (Briviact) 50 MG tablet 180 tablet 0     Sig: Take 1 tablet by mouth Every 12 (Twelve) Hours.      Last filled:09/30/24  Last office visit with prescribing clinician: 4/11/2024      Next office visit with prescribing clinician: 4/14/2025     Lissa Velasquez MA  01/13/25, 16:17 EST

## 2025-01-15 RX ORDER — LACOSAMIDE 200 MG/1
200 TABLET ORAL EVERY 12 HOURS SCHEDULED
Qty: 180 TABLET | Refills: 0 | Status: SHIPPED | OUTPATIENT
Start: 2025-01-15

## 2025-02-10 ENCOUNTER — TELEPHONE (OUTPATIENT)
Dept: NEUROLOGY | Facility: CLINIC | Age: 60
End: 2025-02-10

## 2025-02-10 NOTE — TELEPHONE ENCOUNTER
"Caller: Sada Juárez \"Megan\"    Relationship:  Self    Best call back number: [phone]    PATIENT CALLED REQUESTING TO CANCEL SAME DAY APPT.    Did the patient call AFTER the start time of their scheduled appointment?  []YES  []NO    Was the patient's appointment rescheduled? []YES  [x]NO    Any additional information: PT TESTED POSITIVE FOR COVID YESTERDAY    THANK YOU    "

## 2025-03-13 ENCOUNTER — OFFICE VISIT (OUTPATIENT)
Dept: NEUROLOGY | Facility: CLINIC | Age: 60
End: 2025-03-13
Payer: COMMERCIAL

## 2025-03-13 ENCOUNTER — TELEPHONE (OUTPATIENT)
Dept: NEUROLOGY | Facility: CLINIC | Age: 60
End: 2025-03-13

## 2025-03-13 VITALS
BODY MASS INDEX: 30.23 KG/M2 | HEART RATE: 70 BPM | OXYGEN SATURATION: 97 % | SYSTOLIC BLOOD PRESSURE: 102 MMHG | WEIGHT: 170.6 LBS | HEIGHT: 63 IN | DIASTOLIC BLOOD PRESSURE: 64 MMHG

## 2025-03-13 DIAGNOSIS — G40.419 EPILEPSY, GENERALIZED TONIC-CLONIC, INTRACTABLE: Primary | ICD-10-CM

## 2025-03-13 PROCEDURE — 99213 OFFICE O/P EST LOW 20 MIN: CPT | Performed by: PSYCHIATRY & NEUROLOGY

## 2025-03-13 RX ORDER — LACOSAMIDE 200 MG/1
200 TABLET ORAL EVERY 12 HOURS SCHEDULED
Qty: 180 TABLET | Refills: 3 | Status: SHIPPED | OUTPATIENT
Start: 2025-03-13

## 2025-03-13 NOTE — TELEPHONE ENCOUNTER
Patient brought in DMV ppw to appointment today.  Completed and given back to patient.  Copy scanned into her chart.
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
## 2025-04-18 DIAGNOSIS — G40.419 EPILEPSY, GENERALIZED TONIC-CLONIC, INTRACTABLE: ICD-10-CM

## 2025-04-18 NOTE — TELEPHONE ENCOUNTER
"  Caller: Sada Juárez \"Megan\"    Relationship: Self    Best call back number: [phone]    What is the best time to reach you: ANY    Who are you requesting to speak with (clinical staff, provider,  specific staff member): ANY    Do you know the name of the person who called: NOT SURE    What was the call regarding: STATES SHE MISSED A CALL  "

## 2025-04-18 NOTE — TELEPHONE ENCOUNTER
Rx Refill Note  Requested Prescriptions     Pending Prescriptions Disp Refills    brivaracetam (Briviact) 50 MG tablet 180 tablet 0     Sig: Take 1 tablet by mouth Every 12 (Twelve) Hours.      Last filled:1/15/25 90ds   Last office visit with prescribing clinician: 3/13/2025      Next office visit with prescribing clinician: Visit date not found     Indu Small MA  04/18/25, 16:03 EDT      Pending to provider

## 2025-04-18 NOTE — TELEPHONE ENCOUNTER
"  Caller: Sada Juárez \"Megan\"    Relationship: Self    Best call back number: [phone]    Requested Prescriptions:   Requested Prescriptions     Pending Prescriptions Disp Refills    brivaracetam (Briviact) 50 MG tablet 180 tablet 0     Sig: Take 1 tablet by mouth Every 12 (Twelve) Hours.        Pharmacy where request should be sent: [facility] [id number] [address] [phone] [facility] [phone] FX     Last office visit with prescribing clinician: 3/13/2025   Last telemedicine visit with prescribing clinician: Visit date not found   Next office visit with prescribing clinician: Visit date not found     Additional details provided by patient: OUT OF SCRIPT    Does the patient have less than a 3 day supply:  [x] Yes  [] No    Would you like a call back once the refill request has been completed: [x] Yes [] No    If the office needs to give you a call back, can they leave a voicemail: [x] Yes [] No    Warren Duckworth Rep   04/18/25 14:34 EDT       "

## 2025-04-19 DIAGNOSIS — G40.419 EPILEPSY, GENERALIZED TONIC-CLONIC, INTRACTABLE: ICD-10-CM

## 2025-04-21 RX ORDER — BRIVARACETAM 50 MG/1
50 TABLET, FILM COATED ORAL EVERY 12 HOURS
Qty: 180 TABLET | OUTPATIENT
Start: 2025-04-21

## 2025-04-21 NOTE — TELEPHONE ENCOUNTER
Rx Refill Note  Requested Prescriptions     Pending Prescriptions Disp Refills    Briviact 50 MG tablet [Pharmacy Med Name: BRIVIACT 50 MG TABLET] 180 tablet      Sig: TAKE ONE TABLET BY MOUTH EVERY 12 HOURS      Last filled:4/21/25  Last office visit with prescribing clinician: 3/13/2025      Next office visit with prescribing clinician: Visit date not found     Indu Small MA  04/21/25, 12:57 EDT      Duplicate request-denied

## 2025-07-29 DIAGNOSIS — G40.419 EPILEPSY, GENERALIZED TONIC-CLONIC, INTRACTABLE: ICD-10-CM

## 2025-07-29 RX ORDER — LACOSAMIDE 200 MG/1
200 TABLET ORAL EVERY 12 HOURS SCHEDULED
Qty: 180 TABLET | Refills: 3 | Status: CANCELLED | OUTPATIENT
Start: 2025-07-29

## 2025-07-29 NOTE — TELEPHONE ENCOUNTER
Rx Refill Note  Requested Prescriptions     Pending Prescriptions Disp Refills    lacosamide (Vimpat) 200 MG tablet 180 tablet 3     Sig: Take 1 tablet by mouth Every 12 (Twelve) Hours.    brivaracetam (Briviact) 50 MG tablet 180 tablet 1     Sig: Take 1 tablet by mouth Every 12 (Twelve) Hours.      Last filled:3/13/25 90ds 3 ref  Last office visit with prescribing clinician: 3/13/2025      Next office visit with prescribing clinician: 3/12/2026     Last filled:4/21/25 90ds 1 ref  Last office visit with prescribing clinician: 3/13/2025      Next office visit with prescribing clinician: 3/12/2026     Indu Small MA  07/29/25, 10:08 EDT    Spoke with pharmacy and was told these were just picked up 11 days ago with 90ds.  Cancelled reorder

## 2025-07-29 NOTE — TELEPHONE ENCOUNTER
"  Caller: Marquita Sada KULDEEP \"Megan\"    Relationship: Self    Best call back number: [phone]    Requested Prescriptions:   Requested Prescriptions     Pending Prescriptions Disp Refills    lacosamide (Vimpat) 200 MG tablet 180 tablet 3     Sig: Take 1 tablet by mouth Every 12 (Twelve) Hours.    brivaracetam (Briviact) 50 MG tablet 180 tablet 1     Sig: Take 1 tablet by mouth Every 12 (Twelve) Hours.        Pharmacy where request should be sent: [facility] [id number] [address] [phone] [facility] [phone] FX     Last office visit with prescribing clinician: 3/13/2025   Last telemedicine visit with prescribing clinician: Visit date not found   Next office visit with prescribing clinician: 3/12/26    Additional details provided by patient: JUST PICKED UP LAST REFILLS AND WAS ADVISED BY PHARMACY TO HAVE MORE SENT IN.    Does the patient have less than a 3 day supply:  [] Yes  [x] No    Would you like a call back once the refill request has been completed: [] Yes [x] No    If the office needs to give you a call back, can they leave a voicemail: [] Yes [x] No    Warren Duckworth   07/29/25 08:22 EDT     "